# Patient Record
Sex: FEMALE | Race: WHITE | NOT HISPANIC OR LATINO
[De-identification: names, ages, dates, MRNs, and addresses within clinical notes are randomized per-mention and may not be internally consistent; named-entity substitution may affect disease eponyms.]

---

## 2017-07-24 ENCOUNTER — APPOINTMENT (OUTPATIENT)
Dept: OBGYN | Facility: CLINIC | Age: 52
End: 2017-07-24
Payer: COMMERCIAL

## 2017-07-24 ENCOUNTER — LABORATORY RESULT (OUTPATIENT)
Age: 52
End: 2017-07-24

## 2017-07-24 VITALS
TEMPERATURE: 98 F | WEIGHT: 190 LBS | SYSTOLIC BLOOD PRESSURE: 120 MMHG | HEIGHT: 67.5 IN | DIASTOLIC BLOOD PRESSURE: 60 MMHG | BODY MASS INDEX: 29.47 KG/M2

## 2017-07-24 PROCEDURE — 81003 URINALYSIS AUTO W/O SCOPE: CPT | Mod: QW

## 2017-07-24 PROCEDURE — 99396 PREV VISIT EST AGE 40-64: CPT

## 2017-07-31 LAB
BILIRUB UR QL STRIP: NORMAL
CLARITY UR: CLEAR
COLLECTION METHOD: NORMAL
GLUCOSE UR-MCNC: NORMAL
HCG UR QL: 0.2 EU/DL
HGB UR QL STRIP.AUTO: NORMAL
KETONES UR-MCNC: NORMAL
LEUKOCYTE ESTERASE UR QL STRIP: NORMAL
NITRITE UR QL STRIP: NORMAL
PH UR STRIP: 5
PROT UR STRIP-MCNC: NORMAL
SP GR UR STRIP: 1.02

## 2017-08-28 ENCOUNTER — MESSAGE (OUTPATIENT)
Age: 52
End: 2017-08-28

## 2017-09-01 ENCOUNTER — MESSAGE (OUTPATIENT)
Age: 52
End: 2017-09-01

## 2017-09-05 ENCOUNTER — MESSAGE (OUTPATIENT)
Age: 52
End: 2017-09-05

## 2017-09-11 ENCOUNTER — RX RENEWAL (OUTPATIENT)
Age: 52
End: 2017-09-11

## 2017-10-12 ENCOUNTER — MESSAGE (OUTPATIENT)
Age: 52
End: 2017-10-12

## 2017-10-21 ENCOUNTER — TRANSCRIPTION ENCOUNTER (OUTPATIENT)
Age: 52
End: 2017-10-21

## 2017-10-23 ENCOUNTER — APPOINTMENT (OUTPATIENT)
Dept: OBGYN | Facility: CLINIC | Age: 52
End: 2017-10-23
Payer: COMMERCIAL

## 2017-10-23 VITALS
TEMPERATURE: 98 F | SYSTOLIC BLOOD PRESSURE: 110 MMHG | HEIGHT: 67.5 IN | DIASTOLIC BLOOD PRESSURE: 82 MMHG | WEIGHT: 190 LBS | BODY MASS INDEX: 29.47 KG/M2 | RESPIRATION RATE: 16 BRPM

## 2017-10-23 PROCEDURE — 99213 OFFICE O/P EST LOW 20 MIN: CPT

## 2017-11-07 ENCOUNTER — RX RENEWAL (OUTPATIENT)
Age: 52
End: 2017-11-07

## 2017-11-16 ENCOUNTER — APPOINTMENT (OUTPATIENT)
Dept: ANTEPARTUM | Facility: CLINIC | Age: 52
End: 2017-11-16
Payer: COMMERCIAL

## 2017-11-16 ENCOUNTER — ASOB RESULT (OUTPATIENT)
Age: 52
End: 2017-11-16

## 2017-11-16 ENCOUNTER — APPOINTMENT (OUTPATIENT)
Dept: OBGYN | Facility: CLINIC | Age: 52
End: 2017-11-16
Payer: COMMERCIAL

## 2017-11-16 DIAGNOSIS — N95.0 POSTMENOPAUSAL BLEEDING: ICD-10-CM

## 2017-11-16 PROCEDURE — 76830 TRANSVAGINAL US NON-OB: CPT

## 2017-11-16 PROCEDURE — 99214 OFFICE O/P EST MOD 30 MIN: CPT

## 2017-11-16 PROCEDURE — 76857 US EXAM PELVIC LIMITED: CPT

## 2017-11-20 ENCOUNTER — RESULT REVIEW (OUTPATIENT)
Age: 52
End: 2017-11-20

## 2018-02-05 ENCOUNTER — MESSAGE (OUTPATIENT)
Age: 53
End: 2018-02-05

## 2018-02-05 ENCOUNTER — OUTPATIENT (OUTPATIENT)
Dept: OUTPATIENT SERVICES | Facility: HOSPITAL | Age: 53
LOS: 1 days | Discharge: HOME | End: 2018-02-05

## 2018-02-05 ENCOUNTER — APPOINTMENT (OUTPATIENT)
Dept: OBGYN | Facility: CLINIC | Age: 53
End: 2018-02-05
Payer: COMMERCIAL

## 2018-02-05 VITALS
HEIGHT: 67.5 IN | WEIGHT: 188 LBS | DIASTOLIC BLOOD PRESSURE: 62 MMHG | BODY MASS INDEX: 29.16 KG/M2 | TEMPERATURE: 98.1 F | SYSTOLIC BLOOD PRESSURE: 104 MMHG | RESPIRATION RATE: 16 BRPM

## 2018-02-05 VITALS
SYSTOLIC BLOOD PRESSURE: 107 MMHG | WEIGHT: 188.5 LBS | TEMPERATURE: 98 F | HEART RATE: 67 BPM | DIASTOLIC BLOOD PRESSURE: 79 MMHG | HEIGHT: 67.5 IN | RESPIRATION RATE: 16 BRPM | OXYGEN SATURATION: 100 %

## 2018-02-05 DIAGNOSIS — Z41.9 ENCOUNTER FOR PROCEDURE FOR PURPOSES OTHER THAN REMEDYING HEALTH STATE, UNSPECIFIED: Chronic | ICD-10-CM

## 2018-02-05 DIAGNOSIS — N81.6 RECTOCELE: ICD-10-CM

## 2018-02-05 DIAGNOSIS — Z98.890 OTHER SPECIFIED POSTPROCEDURAL STATES: Chronic | ICD-10-CM

## 2018-02-05 DIAGNOSIS — Z90.89 ACQUIRED ABSENCE OF OTHER ORGANS: Chronic | ICD-10-CM

## 2018-02-05 LAB
ANION GAP SERPL CALC-SCNC: 4 MMOL/L — LOW (ref 5–17)
APPEARANCE UR: CLEAR — SIGNIFICANT CHANGE UP
BACTERIA # UR AUTO: (no result)
BASOPHILS # BLD AUTO: 0.1 K/UL — SIGNIFICANT CHANGE UP (ref 0–0.2)
BASOPHILS NFR BLD AUTO: 1.1 % — SIGNIFICANT CHANGE UP (ref 0–2)
BILIRUB UR-MCNC: NEGATIVE — SIGNIFICANT CHANGE UP
BLD GP AB SCN SERPL QL: SIGNIFICANT CHANGE UP
BUN SERPL-MCNC: 13 MG/DL — SIGNIFICANT CHANGE UP (ref 7–23)
CALCIUM SERPL-MCNC: 9.2 MG/DL — SIGNIFICANT CHANGE UP (ref 8.5–10.1)
CHLORIDE SERPL-SCNC: 108 MMOL/L — SIGNIFICANT CHANGE UP (ref 96–108)
CO2 SERPL-SCNC: 29 MMOL/L — SIGNIFICANT CHANGE UP (ref 22–31)
COLOR SPEC: YELLOW — SIGNIFICANT CHANGE UP
COMMENT - URINE: SIGNIFICANT CHANGE UP
CREAT SERPL-MCNC: 0.83 MG/DL — SIGNIFICANT CHANGE UP (ref 0.5–1.3)
DIFF PNL FLD: NEGATIVE — SIGNIFICANT CHANGE UP
EOSINOPHIL # BLD AUTO: 0.2 K/UL — SIGNIFICANT CHANGE UP (ref 0–0.5)
EOSINOPHIL NFR BLD AUTO: 2.5 % — SIGNIFICANT CHANGE UP (ref 0–6)
EPI CELLS # UR: SIGNIFICANT CHANGE UP
GLUCOSE SERPL-MCNC: 72 MG/DL — SIGNIFICANT CHANGE UP (ref 70–99)
GLUCOSE UR QL: NEGATIVE MG/DL — SIGNIFICANT CHANGE UP
HCT VFR BLD CALC: 43.1 % — SIGNIFICANT CHANGE UP (ref 34.5–45)
HGB BLD-MCNC: 14.7 G/DL — SIGNIFICANT CHANGE UP (ref 11.5–15.5)
KETONES UR-MCNC: NEGATIVE — SIGNIFICANT CHANGE UP
LEUKOCYTE ESTERASE UR-ACNC: (no result)
LYMPHOCYTES # BLD AUTO: 3.1 K/UL — SIGNIFICANT CHANGE UP (ref 1–3.3)
LYMPHOCYTES # BLD AUTO: 39.7 % — SIGNIFICANT CHANGE UP (ref 13–44)
MCHC RBC-ENTMCNC: 28.9 PG — SIGNIFICANT CHANGE UP (ref 27–34)
MCHC RBC-ENTMCNC: 34.2 GM/DL — SIGNIFICANT CHANGE UP (ref 32–36)
MCV RBC AUTO: 84.6 FL — SIGNIFICANT CHANGE UP (ref 80–100)
MONOCYTES # BLD AUTO: 0.6 K/UL — SIGNIFICANT CHANGE UP (ref 0–0.9)
MONOCYTES NFR BLD AUTO: 7.5 % — SIGNIFICANT CHANGE UP (ref 2–14)
NEUTROPHILS # BLD AUTO: 3.8 K/UL — SIGNIFICANT CHANGE UP (ref 1.8–7.4)
NEUTROPHILS NFR BLD AUTO: 49.1 % — SIGNIFICANT CHANGE UP (ref 43–77)
NITRITE UR-MCNC: NEGATIVE — SIGNIFICANT CHANGE UP
PH UR: 6.5 — SIGNIFICANT CHANGE UP (ref 5–8)
PLATELET # BLD AUTO: 256 K/UL — SIGNIFICANT CHANGE UP (ref 150–400)
POTASSIUM SERPL-MCNC: 4.3 MMOL/L — SIGNIFICANT CHANGE UP (ref 3.5–5.3)
POTASSIUM SERPL-SCNC: 4.3 MMOL/L — SIGNIFICANT CHANGE UP (ref 3.5–5.3)
PROT UR-MCNC: NEGATIVE MG/DL — SIGNIFICANT CHANGE UP
RBC # BLD: 5.1 M/UL — SIGNIFICANT CHANGE UP (ref 3.8–5.2)
RBC # FLD: 13.3 % — SIGNIFICANT CHANGE UP (ref 10.3–14.5)
RBC CASTS # UR COMP ASSIST: SIGNIFICANT CHANGE UP /HPF (ref 0–4)
SODIUM SERPL-SCNC: 141 MMOL/L — SIGNIFICANT CHANGE UP (ref 135–145)
SP GR SPEC: 1.02 — SIGNIFICANT CHANGE UP (ref 1.01–1.02)
TYPE + AB SCN PNL BLD: SIGNIFICANT CHANGE UP
UROBILINOGEN FLD QL: 4 MG/DL
WBC # BLD: 7.7 K/UL — SIGNIFICANT CHANGE UP (ref 3.8–10.5)
WBC # FLD AUTO: 7.7 K/UL — SIGNIFICANT CHANGE UP (ref 3.8–10.5)
WBC UR QL: SIGNIFICANT CHANGE UP /HPF (ref 0–5)

## 2018-02-05 PROCEDURE — 99214 OFFICE O/P EST MOD 30 MIN: CPT

## 2018-02-05 NOTE — H&P PST ADULT - ASSESSMENT
51 y/o F scheduled for posterior repair with Dr. Munguia on 2/16/18    1. Labs as per surgeon  2. Discussed EZ sponges, pre-op & day of procedure instructions

## 2018-02-05 NOTE — H&P PST ADULT - PMH
Constipated    GERD (gastroesophageal reflux disease)    Hemorrhoids    Hot flashes    Multiple sclerosis    Paresthesia of arm    Urinary frequency    Urinary hesitancy

## 2018-02-05 NOTE — H&P PST ADULT - HISTORY OF PRESENT ILLNESS
This is a 51 y/o F with PMH MS, paresthesia of L arm, hot flashes, urinary frequency, murmur, GERD, constipation This is a 53 y/o F with PMH MS, paresthesia of L arm, hot flashes, urinary frequency, murmur, GERD, constipation who presents to PST prior to posterior repair with Dr. Munguia scheduled for 2/16/18. Reports vaginal/rectal prolapse that has occurred and worsened throughout 6 vaginal deliveries. Reports scant red blood associated with hemorrhoids that occurs infrequently, denies dark stools, abd. pain, CP, SOB, fevers, dizziness, LE edema.

## 2018-02-12 ENCOUNTER — RX RENEWAL (OUTPATIENT)
Age: 53
End: 2018-02-12

## 2018-02-15 RX ORDER — OXYCODONE HYDROCHLORIDE 5 MG/1
10 TABLET ORAL EVERY 6 HOURS
Qty: 0 | Refills: 0 | Status: DISCONTINUED | OUTPATIENT
Start: 2018-02-16 | End: 2018-02-16

## 2018-02-15 RX ORDER — ONDANSETRON 8 MG/1
4 TABLET, FILM COATED ORAL ONCE
Qty: 0 | Refills: 0 | Status: COMPLETED | OUTPATIENT
Start: 2018-02-16 | End: 2018-02-16

## 2018-02-16 ENCOUNTER — RESULT REVIEW (OUTPATIENT)
Age: 53
End: 2018-02-16

## 2018-02-16 ENCOUNTER — OUTPATIENT (OUTPATIENT)
Dept: OUTPATIENT SERVICES | Facility: HOSPITAL | Age: 53
LOS: 1 days | Discharge: HOME | End: 2018-02-16
Payer: COMMERCIAL

## 2018-02-16 ENCOUNTER — APPOINTMENT (OUTPATIENT)
Dept: OBGYN | Facility: HOSPITAL | Age: 53
End: 2018-02-16

## 2018-02-16 VITALS
RESPIRATION RATE: 14 BRPM | DIASTOLIC BLOOD PRESSURE: 73 MMHG | TEMPERATURE: 98 F | OXYGEN SATURATION: 100 % | SYSTOLIC BLOOD PRESSURE: 126 MMHG | HEART RATE: 79 BPM

## 2018-02-16 VITALS
OXYGEN SATURATION: 99 % | TEMPERATURE: 98 F | HEART RATE: 68 BPM | SYSTOLIC BLOOD PRESSURE: 119 MMHG | WEIGHT: 188.5 LBS | RESPIRATION RATE: 16 BRPM | DIASTOLIC BLOOD PRESSURE: 68 MMHG | HEIGHT: 67.5 IN

## 2018-02-16 DIAGNOSIS — Z98.890 OTHER SPECIFIED POSTPROCEDURAL STATES: Chronic | ICD-10-CM

## 2018-02-16 DIAGNOSIS — Z41.9 ENCOUNTER FOR PROCEDURE FOR PURPOSES OTHER THAN REMEDYING HEALTH STATE, UNSPECIFIED: Chronic | ICD-10-CM

## 2018-02-16 DIAGNOSIS — Z90.89 ACQUIRED ABSENCE OF OTHER ORGANS: Chronic | ICD-10-CM

## 2018-02-16 LAB — HCG UR QL: NEGATIVE — SIGNIFICANT CHANGE UP

## 2018-02-16 PROCEDURE — 57265 CMBN AP COLPRHY W/NTRCL RPR: CPT

## 2018-02-16 PROCEDURE — 88302 TISSUE EXAM BY PATHOLOGIST: CPT | Mod: 26

## 2018-02-16 RX ORDER — OXYCODONE HYDROCHLORIDE 5 MG/1
5 TABLET ORAL EVERY 4 HOURS
Qty: 0 | Refills: 0 | Status: DISCONTINUED | OUTPATIENT
Start: 2018-02-16 | End: 2018-02-16

## 2018-02-16 RX ORDER — CHOLECALCIFEROL (VITAMIN D3) 125 MCG
1 CAPSULE ORAL
Qty: 0 | Refills: 0 | COMMUNITY

## 2018-02-16 RX ORDER — PROGESTERONE 200 MG/1
2 CAPSULE, LIQUID FILLED ORAL
Qty: 0 | Refills: 0 | COMMUNITY

## 2018-02-16 RX ORDER — NATALIZUMAB 300 MG/15ML
1 INJECTION INTRAVENOUS
Qty: 0 | Refills: 0 | COMMUNITY

## 2018-02-16 RX ORDER — FENTANYL CITRATE 50 UG/ML
50 INJECTION INTRAVENOUS
Qty: 0 | Refills: 0 | Status: DISCONTINUED | OUTPATIENT
Start: 2018-02-16 | End: 2018-02-16

## 2018-02-16 RX ORDER — GABAPENTIN 400 MG/1
1 CAPSULE ORAL
Qty: 0 | Refills: 0 | COMMUNITY

## 2018-02-16 RX ORDER — TAMSULOSIN HYDROCHLORIDE 0.4 MG/1
1 CAPSULE ORAL
Qty: 0 | Refills: 0 | COMMUNITY

## 2018-02-16 RX ORDER — ESTERIFIED ESTROGENS, METHYLTESTOSTERONE 1.25; 2.5 MG/1; MG/1
1 TABLET ORAL
Qty: 0 | Refills: 0 | COMMUNITY

## 2018-02-16 RX ORDER — OXYCODONE AND ACETAMINOPHEN 5; 325 MG/1; MG/1
1 TABLET ORAL EVERY 4 HOURS
Qty: 0 | Refills: 0 | Status: DISCONTINUED | OUTPATIENT
Start: 2018-02-16 | End: 2018-02-16

## 2018-02-16 RX ORDER — SODIUM CHLORIDE 9 MG/ML
1000 INJECTION, SOLUTION INTRAVENOUS
Qty: 0 | Refills: 0 | Status: DISCONTINUED | OUTPATIENT
Start: 2018-02-16 | End: 2018-02-16

## 2018-02-16 RX ORDER — IBUPROFEN 200 MG
400 TABLET ORAL EVERY 8 HOURS
Qty: 0 | Refills: 0 | Status: DISCONTINUED | OUTPATIENT
Start: 2018-02-16 | End: 2018-03-03

## 2018-02-16 RX ORDER — PREGABALIN 225 MG/1
1 CAPSULE ORAL
Qty: 0 | Refills: 0 | COMMUNITY

## 2018-02-16 RX ADMIN — OXYCODONE HYDROCHLORIDE 10 MILLIGRAM(S): 5 TABLET ORAL at 17:33

## 2018-02-16 RX ADMIN — ONDANSETRON 4 MILLIGRAM(S): 8 TABLET, FILM COATED ORAL at 17:32

## 2018-02-16 NOTE — ASU PATIENT PROFILE, ADULT - VISION (WITH CORRECTIVE LENSES IF THE PATIENT USUALLY WEARS THEM):
OTC glasses for reading/Partially impaired: cannot see medication labels or newsprint, but can see obstacles in path, and the surrounding layout; can count fingers at arm's length

## 2018-02-16 NOTE — ASU DISCHARGE PLAN (ADULT/PEDIATRIC). - NOTIFY
Increased Irritability or Sluggishness/Excessive Diarrhea/Inability to Tolerate Liquids or Foods/GYN Fever>100.4/Pain not relieved by Medications/Persistent Nausea and Vomiting/Bleeding that does not stop

## 2018-02-16 NOTE — BRIEF OPERATIVE NOTE - PROCEDURE
<<-----Click on this checkbox to enter Procedure Colporrhaphy for repair of rectocele  02/16/2018    Active  CARMEN  Enterocele repair  02/16/2018    Active  CARMEN

## 2018-02-16 NOTE — BRIEF OPERATIVE NOTE - POST-OP DX
Enterocele  02/16/2018    Active  Emiel Munguia  Rectocele, female  02/16/2018    Active  Emile Munguia

## 2018-02-16 NOTE — ASU PATIENT PROFILE, ADULT - PMH
Constipated    GERD (gastroesophageal reflux disease)    Hemorrhoids    Hot flashes    Multiple sclerosis    Paresthesia of arm  left  Rectocele    Urinary frequency    Urinary hesitancy

## 2018-02-16 NOTE — ASU DISCHARGE PLAN (ADULT/PEDIATRIC). - MEDICATION SUMMARY - MEDICATIONS TO TAKE
I will START or STAY ON the medications listed below when I get home from the hospital:    hydrocodone-ibuprofen 7.5 mg-200 mg oral tablet  -- 1 tab(s) by mouth every 4 to 6 hours, As Needed -for severe pain  -for severe pain MDD:6 tabs   -- Caution federal law prohibits the transfer of this drug to any person other  than the person for whom it was prescribed.  Do not drink alcoholic beverages when taking this medication.  Do not take aspirin or aspirin containing products without knowledge and consent of your physician.  May cause drowsiness.  Alcohol may intensify this effect.  Use care when operating dangerous machinery.  Take with food or milk.  This drug may impair the ability to drive or operate machinery.  Use care until you become familiar with its effects.  Using more of this medication than prescribed may cause serious breathing problems.    -- Indication: For RECTOCELE

## 2018-02-21 LAB — SURGICAL PATHOLOGY FINAL REPORT - CH: SIGNIFICANT CHANGE UP

## 2018-02-23 DIAGNOSIS — G35 MULTIPLE SCLEROSIS: ICD-10-CM

## 2018-02-23 DIAGNOSIS — N81.6 RECTOCELE: ICD-10-CM

## 2018-02-23 DIAGNOSIS — N81.5 VAGINAL ENTEROCELE: ICD-10-CM

## 2018-02-26 ENCOUNTER — APPOINTMENT (OUTPATIENT)
Dept: OBGYN | Facility: CLINIC | Age: 53
End: 2018-02-26
Payer: COMMERCIAL

## 2018-02-26 VITALS
HEIGHT: 67.5 IN | SYSTOLIC BLOOD PRESSURE: 102 MMHG | RESPIRATION RATE: 16 BRPM | WEIGHT: 186 LBS | BODY MASS INDEX: 28.85 KG/M2 | DIASTOLIC BLOOD PRESSURE: 66 MMHG | TEMPERATURE: 98.4 F

## 2018-02-26 PROCEDURE — 99024 POSTOP FOLLOW-UP VISIT: CPT

## 2018-02-26 PROCEDURE — 85018 HEMOGLOBIN: CPT | Mod: QW

## 2018-03-06 LAB — HEMOGLOBIN: 12.4

## 2018-03-09 ENCOUNTER — RX RENEWAL (OUTPATIENT)
Age: 53
End: 2018-03-09

## 2018-03-29 ENCOUNTER — APPOINTMENT (OUTPATIENT)
Dept: OBGYN | Facility: CLINIC | Age: 53
End: 2018-03-29
Payer: COMMERCIAL

## 2018-03-29 VITALS
WEIGHT: 186 LBS | SYSTOLIC BLOOD PRESSURE: 114 MMHG | BODY MASS INDEX: 28.85 KG/M2 | DIASTOLIC BLOOD PRESSURE: 72 MMHG | HEIGHT: 67.5 IN

## 2018-03-29 DIAGNOSIS — N95.2 POSTMENOPAUSAL ATROPHIC VAGINITIS: ICD-10-CM

## 2018-03-29 DIAGNOSIS — Z48.89 ENCOUNTER FOR OTHER SPECIFIED SURGICAL AFTERCARE: ICD-10-CM

## 2018-03-29 DIAGNOSIS — R10.2 PELVIC AND PERINEAL PAIN: ICD-10-CM

## 2018-03-29 PROCEDURE — 99024 POSTOP FOLLOW-UP VISIT: CPT

## 2018-04-11 ENCOUNTER — RX RENEWAL (OUTPATIENT)
Age: 53
End: 2018-04-11

## 2018-05-10 ENCOUNTER — MEDICATION RENEWAL (OUTPATIENT)
Age: 53
End: 2018-05-10

## 2018-07-26 ENCOUNTER — APPOINTMENT (OUTPATIENT)
Dept: OBGYN | Facility: CLINIC | Age: 53
End: 2018-07-26
Payer: COMMERCIAL

## 2018-07-26 VITALS
SYSTOLIC BLOOD PRESSURE: 112 MMHG | HEIGHT: 68 IN | RESPIRATION RATE: 18 BRPM | BODY MASS INDEX: 28.79 KG/M2 | TEMPERATURE: 97.7 F | WEIGHT: 190 LBS | DIASTOLIC BLOOD PRESSURE: 70 MMHG | OXYGEN SATURATION: 98 %

## 2018-07-26 DIAGNOSIS — N81.6 RECTOCELE: ICD-10-CM

## 2018-07-26 PROCEDURE — 99396 PREV VISIT EST AGE 40-64: CPT

## 2018-07-31 LAB — CYTOLOGY CVX/VAG DOC THIN PREP: NORMAL

## 2019-03-01 ENCOUNTER — TRANSCRIPTION ENCOUNTER (OUTPATIENT)
Age: 54
End: 2019-03-01

## 2019-03-15 ENCOUNTER — MEDICATION RENEWAL (OUTPATIENT)
Age: 54
End: 2019-03-15

## 2019-04-25 PROBLEM — K59.00 CONSTIPATION, UNSPECIFIED: Chronic | Status: ACTIVE | Noted: 2018-02-05

## 2019-04-25 PROBLEM — K64.9 UNSPECIFIED HEMORRHOIDS: Chronic | Status: ACTIVE | Noted: 2018-02-05

## 2019-04-25 PROBLEM — N81.6 RECTOCELE: Chronic | Status: ACTIVE | Noted: 2018-02-16

## 2019-04-25 PROBLEM — G35 MULTIPLE SCLEROSIS: Chronic | Status: ACTIVE | Noted: 2018-02-05

## 2019-04-25 PROBLEM — R35.0 FREQUENCY OF MICTURITION: Chronic | Status: ACTIVE | Noted: 2018-02-05

## 2019-04-25 PROBLEM — K21.9 GASTRO-ESOPHAGEAL REFLUX DISEASE WITHOUT ESOPHAGITIS: Chronic | Status: ACTIVE | Noted: 2018-02-05

## 2019-04-25 PROBLEM — R23.2 FLUSHING: Chronic | Status: ACTIVE | Noted: 2018-02-05

## 2019-04-25 PROBLEM — R39.11 HESITANCY OF MICTURITION: Chronic | Status: ACTIVE | Noted: 2018-02-05

## 2019-04-25 PROBLEM — R20.2 PARESTHESIA OF SKIN: Chronic | Status: ACTIVE | Noted: 2018-02-05

## 2019-04-29 ENCOUNTER — TRANSCRIPTION ENCOUNTER (OUTPATIENT)
Age: 54
End: 2019-04-29

## 2019-04-29 ENCOUNTER — MEDICATION RENEWAL (OUTPATIENT)
Age: 54
End: 2019-04-29

## 2019-07-31 ENCOUNTER — MEDICATION RENEWAL (OUTPATIENT)
Age: 54
End: 2019-07-31

## 2019-08-08 ENCOUNTER — APPOINTMENT (OUTPATIENT)
Dept: OBGYN | Facility: CLINIC | Age: 54
End: 2019-08-08
Payer: COMMERCIAL

## 2019-08-08 VITALS
DIASTOLIC BLOOD PRESSURE: 80 MMHG | WEIGHT: 190.48 LBS | BODY MASS INDEX: 28.87 KG/M2 | SYSTOLIC BLOOD PRESSURE: 110 MMHG | HEIGHT: 68 IN

## 2019-08-08 DIAGNOSIS — Z78.9 OTHER SPECIFIED HEALTH STATUS: ICD-10-CM

## 2019-08-08 PROCEDURE — 99396 PREV VISIT EST AGE 40-64: CPT

## 2019-08-08 RX ORDER — ACETAMINOPHEN 325 MG
TABLET ORAL
Refills: 0 | Status: ACTIVE | COMMUNITY

## 2019-08-08 RX ORDER — DOCUSATE SODIUM 100 MG/1
100 CAPSULE ORAL
Refills: 0 | Status: ACTIVE | COMMUNITY

## 2019-08-08 RX ORDER — VITAMIN K2 90 MCG
125 MCG CAPSULE ORAL
Refills: 0 | Status: ACTIVE | COMMUNITY

## 2019-08-08 NOTE — PHYSICAL EXAM
[Awake] : awake [Alert] : alert [Acute Distress] : no acute distress [Mass] : no breast mass [Nipple Discharge] : no nipple discharge [Axillary LAD] : no axillary lymphadenopathy [Soft] : soft [Oriented x3] : oriented to person, place, and time [Tender] : non tender [No Bleeding] : there was no active vaginal bleeding [Uterine Adnexae] : were not tender and not enlarged [Occult Blood] : occult blood test from digital rectal exam was negative [Normal rectal exam] : was normal [Normal Brown Stool] : was normal and brown [Internal Hemorrhoid] : no internal hemorrhoids were present [Skin Tags] : no residual hemorrhoidal skin tags [External Hemorrhoid] : no external hemorrhoids were present [Normal] : was normal [None] : there was no rectal abscess

## 2019-08-08 NOTE — COUNSELING
[Nutrition] : nutrition [Breast Self Exam] : breast self exam [Vitamins/Supplements] : vitamins/supplements [Exercise] : exercise [Weight Management] : weight management

## 2019-09-16 ENCOUNTER — MEDICATION RENEWAL (OUTPATIENT)
Age: 54
End: 2019-09-16

## 2019-09-24 ENCOUNTER — FORM ENCOUNTER (OUTPATIENT)
Age: 54
End: 2019-09-24

## 2019-09-24 DIAGNOSIS — R92.1 MAMMOGRAPHIC CALCIFICATION FOUND ON DIAGNOSTIC IMAGING OF BREAST: ICD-10-CM

## 2019-09-25 ENCOUNTER — OUTPATIENT (OUTPATIENT)
Dept: OUTPATIENT SERVICES | Facility: HOSPITAL | Age: 54
LOS: 1 days | End: 2019-09-25
Payer: COMMERCIAL

## 2019-09-25 ENCOUNTER — APPOINTMENT (OUTPATIENT)
Dept: RADIOLOGY | Facility: CLINIC | Age: 54
End: 2019-09-25
Payer: COMMERCIAL

## 2019-09-25 ENCOUNTER — APPOINTMENT (OUTPATIENT)
Dept: MAMMOGRAPHY | Facility: CLINIC | Age: 54
End: 2019-09-25
Payer: COMMERCIAL

## 2019-09-25 DIAGNOSIS — Z98.890 OTHER SPECIFIED POSTPROCEDURAL STATES: Chronic | ICD-10-CM

## 2019-09-25 DIAGNOSIS — Z41.9 ENCOUNTER FOR PROCEDURE FOR PURPOSES OTHER THAN REMEDYING HEALTH STATE, UNSPECIFIED: Chronic | ICD-10-CM

## 2019-09-25 DIAGNOSIS — Z90.89 ACQUIRED ABSENCE OF OTHER ORGANS: Chronic | ICD-10-CM

## 2019-09-25 DIAGNOSIS — Z00.8 ENCOUNTER FOR OTHER GENERAL EXAMINATION: ICD-10-CM

## 2019-09-25 PROCEDURE — 77080 DXA BONE DENSITY AXIAL: CPT | Mod: 26

## 2019-09-25 PROCEDURE — 77063 BREAST TOMOSYNTHESIS BI: CPT | Mod: 26

## 2019-09-25 PROCEDURE — 77080 DXA BONE DENSITY AXIAL: CPT

## 2019-09-25 PROCEDURE — 77067 SCR MAMMO BI INCL CAD: CPT

## 2019-09-25 PROCEDURE — 77063 BREAST TOMOSYNTHESIS BI: CPT

## 2019-09-25 PROCEDURE — 77067 SCR MAMMO BI INCL CAD: CPT | Mod: 26

## 2019-09-26 ENCOUNTER — OTHER (OUTPATIENT)
Age: 54
End: 2019-09-26

## 2019-09-26 PROBLEM — R92.1 BREAST CALCIFICATION, LEFT: Status: ACTIVE | Noted: 2019-09-26

## 2019-10-01 ENCOUNTER — FORM ENCOUNTER (OUTPATIENT)
Age: 54
End: 2019-10-01

## 2019-10-02 ENCOUNTER — OUTPATIENT (OUTPATIENT)
Dept: OUTPATIENT SERVICES | Facility: HOSPITAL | Age: 54
LOS: 1 days | End: 2019-10-02
Payer: COMMERCIAL

## 2019-10-02 ENCOUNTER — APPOINTMENT (OUTPATIENT)
Dept: MAMMOGRAPHY | Facility: CLINIC | Age: 54
End: 2019-10-02
Payer: COMMERCIAL

## 2019-10-02 DIAGNOSIS — Z00.8 ENCOUNTER FOR OTHER GENERAL EXAMINATION: ICD-10-CM

## 2019-10-02 DIAGNOSIS — Z98.890 OTHER SPECIFIED POSTPROCEDURAL STATES: Chronic | ICD-10-CM

## 2019-10-02 DIAGNOSIS — Z41.9 ENCOUNTER FOR PROCEDURE FOR PURPOSES OTHER THAN REMEDYING HEALTH STATE, UNSPECIFIED: Chronic | ICD-10-CM

## 2019-10-02 DIAGNOSIS — Z90.89 ACQUIRED ABSENCE OF OTHER ORGANS: Chronic | ICD-10-CM

## 2019-10-02 PROCEDURE — 77065 DX MAMMO INCL CAD UNI: CPT | Mod: 26,LT

## 2019-10-02 PROCEDURE — 77065 DX MAMMO INCL CAD UNI: CPT

## 2019-10-03 ENCOUNTER — CLINICAL ADVICE (OUTPATIENT)
Age: 54
End: 2019-10-03

## 2019-10-15 ENCOUNTER — MEDICATION RENEWAL (OUTPATIENT)
Age: 54
End: 2019-10-15

## 2019-12-20 ENCOUNTER — APPOINTMENT (OUTPATIENT)
Dept: FAMILY MEDICINE | Facility: CLINIC | Age: 54
End: 2019-12-20

## 2020-05-13 ENCOUNTER — RESULT REVIEW (OUTPATIENT)
Age: 55
End: 2020-05-13

## 2020-05-13 ENCOUNTER — APPOINTMENT (OUTPATIENT)
Dept: MAMMOGRAPHY | Facility: CLINIC | Age: 55
End: 2020-05-13
Payer: COMMERCIAL

## 2020-05-13 ENCOUNTER — TRANSCRIPTION ENCOUNTER (OUTPATIENT)
Age: 55
End: 2020-05-13

## 2020-05-13 ENCOUNTER — OUTPATIENT (OUTPATIENT)
Dept: OUTPATIENT SERVICES | Facility: HOSPITAL | Age: 55
LOS: 1 days | End: 2020-05-13
Payer: COMMERCIAL

## 2020-05-13 DIAGNOSIS — Z41.9 ENCOUNTER FOR PROCEDURE FOR PURPOSES OTHER THAN REMEDYING HEALTH STATE, UNSPECIFIED: Chronic | ICD-10-CM

## 2020-05-13 DIAGNOSIS — Z98.890 OTHER SPECIFIED POSTPROCEDURAL STATES: Chronic | ICD-10-CM

## 2020-05-13 DIAGNOSIS — R92.1 MAMMOGRAPHIC CALCIFICATION FOUND ON DIAGNOSTIC IMAGING OF BREAST: ICD-10-CM

## 2020-05-13 DIAGNOSIS — Z90.89 ACQUIRED ABSENCE OF OTHER ORGANS: Chronic | ICD-10-CM

## 2020-05-13 PROCEDURE — 77065 DX MAMMO INCL CAD UNI: CPT

## 2020-05-13 PROCEDURE — 77065 DX MAMMO INCL CAD UNI: CPT | Mod: 26,LT

## 2020-05-13 PROCEDURE — G0279: CPT | Mod: 26

## 2020-05-13 PROCEDURE — G0279: CPT

## 2020-05-22 ENCOUNTER — APPOINTMENT (OUTPATIENT)
Dept: OBGYN | Facility: CLINIC | Age: 55
End: 2020-05-22
Payer: COMMERCIAL

## 2020-05-22 DIAGNOSIS — N95.1 MENOPAUSAL AND FEMALE CLIMACTERIC STATES: ICD-10-CM

## 2020-05-22 PROCEDURE — 99441: CPT

## 2020-08-13 ENCOUNTER — APPOINTMENT (OUTPATIENT)
Dept: OBGYN | Facility: CLINIC | Age: 55
End: 2020-08-13

## 2020-09-14 ENCOUNTER — APPOINTMENT (OUTPATIENT)
Dept: OBGYN | Facility: CLINIC | Age: 55
End: 2020-09-14
Payer: COMMERCIAL

## 2020-09-14 VITALS
BODY MASS INDEX: 28.64 KG/M2 | WEIGHT: 189 LBS | HEIGHT: 68 IN | SYSTOLIC BLOOD PRESSURE: 110 MMHG | DIASTOLIC BLOOD PRESSURE: 70 MMHG

## 2020-09-14 DIAGNOSIS — E89.41 SYMPTOMATIC POSTPROCEDURAL OVARIAN FAILURE: ICD-10-CM

## 2020-09-14 PROCEDURE — 99396 PREV VISIT EST AGE 40-64: CPT

## 2020-09-14 PROCEDURE — 82270 OCCULT BLOOD FECES: CPT

## 2020-09-14 NOTE — COUNSELING
[Body Image] : body image [Nutrition/ Exercise/ Weight Management] : nutrition, exercise, weight management [Vitamins/Supplements] : vitamins/supplements [Bladder Hygiene] : bladder hygiene

## 2020-09-14 NOTE — DISCUSSION/SUMMARY
[FreeTextEntry1] : 55 YO FEMALE PRESENTS FOR ANNUAL GYN EXAMINATION. DISCUSSED HORMONE REPLACEMENT THERAPY, RISKS, BENEFITS AND ALTERNATIVES. MAMMOGRAM ORDERED AND SELF BREAST EXAMINATION TAUGHT AND RECOMMENDED. BONE DENSITY STUDY INDICATIONS REVIEWED AND DISCUSSED. FOLLOW UP SCHEDULED.

## 2020-09-14 NOTE — PHYSICAL EXAM
[Appropriately responsive] : appropriately responsive [Alert] : alert [No Lymphadenopathy] : no lymphadenopathy [Regular Rate Rhythm] : regular rate rhythm [No Acute Distress] : no acute distress [Clear to Auscultation B/L] : clear to auscultation bilaterally [Soft] : soft [No Murmurs] : no murmurs [Non-distended] : non-distended [Non-tender] : non-tender [No Mass] : no mass [No Lesions] : no lesions [No HSM] : No HSM [Examination Of The Breasts] : a normal appearance [Oriented x3] : oriented x3 [No Masses] : no breast masses were palpable [Labia Minora] : normal [Labia Majora] : normal [Uterine Adnexae] : normal [Normal rectal exam] : was normal [Normal Brown Stool] : was normal and brown [External Hemorrhoid] : no external hemorrhoids were present [Internal Hemorrhoid] : no internal hemorrhoids were present [Skin Tags] : no residual hemorrhoidal skin tags [Normal] : was normal [None] : there was no rectal abscess

## 2020-11-13 ENCOUNTER — RESULT REVIEW (OUTPATIENT)
Age: 55
End: 2020-11-13

## 2020-11-13 ENCOUNTER — APPOINTMENT (OUTPATIENT)
Dept: MAMMOGRAPHY | Facility: CLINIC | Age: 55
End: 2020-11-13
Payer: COMMERCIAL

## 2020-11-13 ENCOUNTER — OUTPATIENT (OUTPATIENT)
Dept: OUTPATIENT SERVICES | Facility: HOSPITAL | Age: 55
LOS: 1 days | End: 2020-11-13
Payer: COMMERCIAL

## 2020-11-13 DIAGNOSIS — Z90.89 ACQUIRED ABSENCE OF OTHER ORGANS: Chronic | ICD-10-CM

## 2020-11-13 DIAGNOSIS — Z98.890 OTHER SPECIFIED POSTPROCEDURAL STATES: Chronic | ICD-10-CM

## 2020-11-13 DIAGNOSIS — Z00.8 ENCOUNTER FOR OTHER GENERAL EXAMINATION: ICD-10-CM

## 2020-11-13 DIAGNOSIS — Z41.9 ENCOUNTER FOR PROCEDURE FOR PURPOSES OTHER THAN REMEDYING HEALTH STATE, UNSPECIFIED: Chronic | ICD-10-CM

## 2020-11-13 PROCEDURE — 77066 DX MAMMO INCL CAD BI: CPT | Mod: 26

## 2020-11-13 PROCEDURE — G0279: CPT | Mod: 26

## 2020-11-13 PROCEDURE — 77066 DX MAMMO INCL CAD BI: CPT

## 2020-11-13 PROCEDURE — G0279: CPT

## 2020-11-23 ENCOUNTER — NON-APPOINTMENT (OUTPATIENT)
Age: 55
End: 2020-11-23

## 2021-06-04 ENCOUNTER — NON-APPOINTMENT (OUTPATIENT)
Age: 56
End: 2021-06-04

## 2021-06-23 ENCOUNTER — NON-APPOINTMENT (OUTPATIENT)
Age: 56
End: 2021-06-23

## 2021-06-24 ENCOUNTER — APPOINTMENT (OUTPATIENT)
Dept: FAMILY MEDICINE | Facility: CLINIC | Age: 56
End: 2021-06-24
Payer: COMMERCIAL

## 2021-06-24 VITALS
OXYGEN SATURATION: 97 % | DIASTOLIC BLOOD PRESSURE: 68 MMHG | TEMPERATURE: 96.2 F | BODY MASS INDEX: 28.39 KG/M2 | WEIGHT: 183 LBS | HEART RATE: 80 BPM | SYSTOLIC BLOOD PRESSURE: 110 MMHG | HEIGHT: 67.5 IN

## 2021-06-24 VITALS
TEMPERATURE: 96.2 F | OXYGEN SATURATION: 97 % | DIASTOLIC BLOOD PRESSURE: 68 MMHG | HEART RATE: 80 BPM | WEIGHT: 183 LBS | HEIGHT: 67.5 IN | BODY MASS INDEX: 28.39 KG/M2 | SYSTOLIC BLOOD PRESSURE: 110 MMHG

## 2021-06-24 PROCEDURE — 99072 ADDL SUPL MATRL&STAF TM PHE: CPT

## 2021-06-24 PROCEDURE — 99214 OFFICE O/P EST MOD 30 MIN: CPT

## 2021-06-24 RX ORDER — BETHANECHOL CHLORIDE 10 MG/1
10 TABLET ORAL 3 TIMES DAILY
Qty: 21 | Refills: 0 | Status: DISCONTINUED | COMMUNITY
Start: 2018-02-26 | End: 2021-06-24

## 2021-06-24 RX ORDER — ESTERIFIED ESTROGENS AND METHYTESTESTERONE .625; 1.25 MG/1; MG/1
0.625-1.25 TABLET ORAL
Qty: 90 | Refills: 0 | Status: DISCONTINUED | COMMUNITY
Start: 2018-03-09 | End: 2021-06-24

## 2021-06-24 RX ORDER — PROGESTERONE 100 MG/1
100 CAPSULE ORAL
Qty: 90 | Refills: 0 | Status: DISCONTINUED | COMMUNITY
Start: 2017-07-24 | End: 2021-06-24

## 2021-06-24 RX ORDER — ESTERIFIED ESTROGENS AND METHYTESTESTERONE .625; 1.25 MG/1; MG/1
0.625-1.25 TABLET ORAL
Qty: 90 | Refills: 0 | Status: DISCONTINUED | COMMUNITY
Start: 2018-04-12 | End: 2021-06-24

## 2021-06-24 RX ORDER — GLUCOSA SU 2KCL/CHONDROITIN SU 500-400 MG
500 CAPSULE ORAL
Refills: 0 | Status: DISCONTINUED | COMMUNITY
End: 2021-06-24

## 2021-06-24 RX ORDER — GABAPENTIN 100 MG/1
100 CAPSULE ORAL
Qty: 270 | Refills: 0 | Status: DISCONTINUED | COMMUNITY
Start: 2017-07-10 | End: 2021-06-24

## 2021-06-24 RX ORDER — CONJUGATED ESTROGENS 0.62 MG/G
0.62 CREAM VAGINAL
Qty: 32 | Refills: 3 | Status: DISCONTINUED | COMMUNITY
Start: 2018-03-29 | End: 2021-06-24

## 2021-06-24 RX ORDER — ESTERIFIED ESTROGENS AND METHYTESTESTERONE .625; 1.25 MG/1; MG/1
0.625-1.25 TABLET ORAL
Qty: 90 | Refills: 0 | Status: DISCONTINUED | COMMUNITY
Start: 2017-11-08 | End: 2021-06-24

## 2021-06-24 RX ORDER — ESTERIFIED ESTROGENS AND METHYTESTESTERONE .625; 1.25 MG/1; MG/1
0.625-1.25 TABLET ORAL
Qty: 90 | Refills: 0 | Status: DISCONTINUED | COMMUNITY
Start: 2017-10-12 | End: 2021-06-24

## 2021-06-24 RX ORDER — TAMSULOSIN HYDROCHLORIDE 0.4 MG/1
0.4 CAPSULE ORAL
Qty: 90 | Refills: 0 | Status: DISCONTINUED | COMMUNITY
Start: 2017-05-03 | End: 2021-06-24

## 2021-06-24 RX ORDER — ESTERIFIED ESTROGENS AND METHYTESTESTERONE .625; 1.25 MG/1; MG/1
0.625-1.25 TABLET ORAL
Qty: 90 | Refills: 0 | Status: DISCONTINUED | COMMUNITY
Start: 2017-07-24 | End: 2021-06-24

## 2021-06-24 NOTE — PLAN
[FreeTextEntry1] : I have advised that she may consider consulting with a Rheumatologist for extensive Auto Immune labwork for her  recent onset symptoms. Discussed if Auto Immune testing is negative , she may wish to discuss symptoms with her Neurologist and if this could be attributed to MS \par Referral list provided

## 2021-06-24 NOTE — HISTORY OF PRESENT ILLNESS
[FreeTextEntry8] : Patient has had Dry Mouth and Eyes  x several months.  She went to dentist and advised to try  biotin, this relieves her dryness temporarily .\par She spoke with her Neurologist , who ordered Sjogrens panel at patients request and testing was negative. She was advised none of her MS medications could be causing her dry mouth and eyes. She has been on same medication protocol for some time,   \par She hydrates well daily \par Patient is concerned re this new onset of Dry eyes and mouth that it may be autoimmune based .

## 2021-09-03 NOTE — BRIEF OPERATIVE NOTE - PRE-OP
[facility] [place] [address]  [address] [facility], [address] [facility] [address] [facility] [place]: [phone]  F: [phone]    Medical Nutrition Therapy    Nutrition Encounter:  Called and spoke with brother. Patient has gained 10# in the past month. Drinking 6 Boost VHC per day which is providing 3180kcal, 132g protein. He has started to advance to soft foods and tolerating. Has an appt with SLP on 9/8. Results:   Diagnosis: Laryngeal cancer   Cisplatin every 3 weeks and concurrent radiation. Started on 5/25/21    Chemotherapy Flowsheet 7/13/2021   Cycle [id number]   Date 7/13/2021   Drug / Regimen cisplatin   Pre Hydration given   Post Hydration given   Pre Meds given   Notes given     Wt Readings from Last 5 Encounters:   09/02/21 154 lb 9.6 oz (70.1 kg)   09/02/21 155 lb 6.4 oz (70.5 kg)   08/05/21 146 lb 6.4 oz (66.4 kg)   07/29/21 142 lb 9.6 oz (64.7 kg)   07/20/21 146 lb (66.2 kg)       Estimated Nutrition Needs:   Calorie Range: 2345-2680kcal/day    Protein Range: 67-77g/day     Fluid Needs: 2200ml     Assessment:   Inadequate oral food or beverage intake related to concurrent chemoradiation as evidence by treatment side effects. Plan:   - Decrease to 4 Boost VHC per day. - Continue to decrease as PO intake improves. - If he eats a full meal he can skip a Boost VHC. I appreciate the opportunity to participate in Mr. Dm Peterson's care.     Signed By: Jhonatan Bedoya, [address], [address]     Contact: [phone]
<<-----Click on this checkbox to enter Pre-Op Dx

## 2021-09-17 ENCOUNTER — RESULT REVIEW (OUTPATIENT)
Age: 56
End: 2021-09-17

## 2021-09-17 ENCOUNTER — APPOINTMENT (OUTPATIENT)
Dept: OBGYN | Facility: CLINIC | Age: 56
End: 2021-09-17
Payer: COMMERCIAL

## 2021-09-17 VITALS
HEIGHT: 67 IN | DIASTOLIC BLOOD PRESSURE: 60 MMHG | RESPIRATION RATE: 14 BRPM | SYSTOLIC BLOOD PRESSURE: 103 MMHG | WEIGHT: 187 LBS | HEART RATE: 75 BPM | BODY MASS INDEX: 29.35 KG/M2

## 2021-09-17 DIAGNOSIS — Z01.419 ENCOUNTER FOR GYNECOLOGICAL EXAMINATION (GENERAL) (ROUTINE) W/OUT ABNORMAL FINDINGS: ICD-10-CM

## 2021-09-17 PROCEDURE — 99396 PREV VISIT EST AGE 40-64: CPT

## 2021-09-17 NOTE — PLAN
[FreeTextEntry1] :  54YO FEMALE PRESENTS FOR ANNUAL GYN EXAMINATION. DISCUSSED HORMONE REPLACEMENT THERAPY, RISKS, BENEFITS AND ALTERNATIVES. MAMMOGRAM ORDERED AND SELF BREAST EXAMINATION TAUGHT AND RECOMMENDED. BONE DENSITY STUDY INDICATIONS REVIEWED AND DISCUSSED. FOLLOW UP SCHEDULED

## 2021-10-06 ENCOUNTER — NON-APPOINTMENT (OUTPATIENT)
Age: 56
End: 2021-10-06

## 2021-10-07 ENCOUNTER — APPOINTMENT (OUTPATIENT)
Dept: RHEUMATOLOGY | Facility: CLINIC | Age: 56
End: 2021-10-07
Payer: COMMERCIAL

## 2021-10-07 VITALS
HEART RATE: 73 BPM | BODY MASS INDEX: 29.66 KG/M2 | HEIGHT: 67 IN | DIASTOLIC BLOOD PRESSURE: 70 MMHG | OXYGEN SATURATION: 99 % | WEIGHT: 189 LBS | SYSTOLIC BLOOD PRESSURE: 120 MMHG | TEMPERATURE: 97.4 F

## 2021-10-07 DIAGNOSIS — M54.50 LOW BACK PAIN, UNSPECIFIED: ICD-10-CM

## 2021-10-07 DIAGNOSIS — Z78.0 ASYMPTOMATIC MENOPAUSAL STATE: ICD-10-CM

## 2021-10-07 DIAGNOSIS — Z87.19 PERSONAL HISTORY OF OTHER DISEASES OF THE DIGESTIVE SYSTEM: ICD-10-CM

## 2021-10-07 DIAGNOSIS — M25.572 PAIN IN LEFT ANKLE AND JOINTS OF LEFT FOOT: ICD-10-CM

## 2021-10-07 PROCEDURE — 99205 OFFICE O/P NEW HI 60 MIN: CPT

## 2021-10-07 RX ORDER — HYDROCODONE BITARTRATE AND IBUPROFEN 7.5; 2 MG/1; MG/1
7.5-2 TABLET ORAL
Qty: 24 | Refills: 0 | Status: DISCONTINUED | COMMUNITY
Start: 2018-02-16 | End: 2021-10-07

## 2021-10-07 NOTE — REASON FOR VISIT
[Consultation] : a consultation visit [FreeTextEntry1] : dry eyes/ dry mouth; raynaud's like symptoms; bone health

## 2021-10-07 NOTE — ASSESSMENT
[FreeTextEntry1] : 56-year-old female, here for the first time w/ hx of MS reports of dry mouth, dry eyes at times (not confirmed w/ Optho); raynaud's like symptoms in the hands >feet; painful oral ulcers at times; LBP to rule out CTD incld Sjogren syndrome.  \par -No synovitis or effusion on exam noted today and advised to monitor.\par -labs as below incld ESR, CRP, full serologies as below, TSH\par -Referred for xray of left ankle to evaluate for structural changes, r/o erosions \par -dry mouth: discussed biotene mouthwash or toothpaste or spray PRN; not much cavities and does have saliva pooling in the mouth\par -dry eyes: discussed can use artificial tears and check w/ Optho to see if noted \par \par Raynaud's: no ulcers and reports of color changes to red and purple in the hands>feet w/ the cold. Discussed to keep hands warm and if not controlled can add Ca channel blocker if BP allows\par -discussed can be primary idiopathic and will check for secondary autoimmune causes w/ labs as below to be complete\par \par hx of painful oral ulcers: can be aphthous ulcers and will check autoimmune abs incld MAUREEN w/ IF; HLA-B51, etc\par \par LBP: intermittent; reports hx of disc herniation on MRI \par -Referred for SI xray to confirm SI normal \par -Advil PRN w/ food needed sparingly helps\par \par Bone health: postmenopausal patient reports Dexa >2yrs ago w/ Dexa referral given today to evaluate for osteopenia/osteoporosis.\par \par -educated on symptoms to monitor for in detail and alert us if any concerns.\par -knows to stay up to date on health maintenance w/ PCP\par -f/u in 10-14 days w/ labs, xrays please\par

## 2021-10-07 NOTE — HISTORY OF PRESENT ILLNESS
[FreeTextEntry1] : 56-year-old female, here for the first time w/ hx of MS reports of dry mouth since around May of 2021. Patient states she does not have a lot of cavities. \par Patient states she notices dry eyes at times and has not checked with an ophthalmologist to see if it seen on their exam.\par Patient states she can notice some stiffness in her left hand at times. Denies any swelling or warmth and will monitor.\par States she notices some lower back pain worse with lifting; better with resting. Denies any loss of bladder or bowel incontinence or saddle anesthesias.\par States she has history of a disc herniation on MRI in the past.\par States she can have some left ankle soreness at times. She will monitor for any swelling.\par States she has history of oral ulcers that tend to be painful and none currently. Denies any nasal or genital ulcers.\par States she can notice that her hands more than feet can turn white or blue in the cold and controlled w/ keeping them warm without any ulcers.\par Denies any fever/chills, no rashes and no skin thickening today, no infectious diarrhea or  symptoms at this time.\par States she's not had a DEXA in over 2 years

## 2021-10-07 NOTE — PHYSICAL EXAM
[General Appearance - Alert] : alert [General Appearance - In No Acute Distress] : in no acute distress [Sclera] : the sclera and conjunctiva were normal [Extraocular Movements] : extraocular movements were intact [Outer Ear] : the ears and nose were normal in appearance [Neck Appearance] : the appearance of the neck was normal [Respiration, Rhythm And Depth] : normal respiratory rhythm and effort [Heart Rate And Rhythm] : heart rate was normal and rhythm regular [Heart Sounds] : normal S1 and S2 [Abdomen Soft] : soft [Abdomen Tenderness] : non-tender [Cervical Lymph Nodes Enlarged Anterior Bilaterally] : anterior cervical [Supraclavicular Lymph Nodes Enlarged Bilaterally] : supraclavicular [No CVA Tenderness] : no ~M costovertebral angle tenderness [Motor Tone] : muscle strength and tone were normal [] : no rash [No Focal Deficits] : no focal deficits [Impaired Insight] : insight and judgment were intact [Mood] : the mood was normal [FreeTextEntry1] : no synovitis or effusion on exam noted today

## 2021-10-08 LAB
25(OH)D3 SERPL-MCNC: 68.6 NG/ML
ALBUMIN SERPL ELPH-MCNC: 4.7 G/DL
ALP BLD-CCNC: 75 U/L
ALT SERPL-CCNC: 11 U/L
ANION GAP SERPL CALC-SCNC: 13 MMOL/L
AST SERPL-CCNC: 14 U/L
BASOPHILS # BLD AUTO: 0.07 K/UL
BASOPHILS NFR BLD AUTO: 1.1 %
BILIRUB SERPL-MCNC: 0.5 MG/DL
BUN SERPL-MCNC: 10 MG/DL
CALCIUM SERPL-MCNC: 9.8 MG/DL
CHLORIDE SERPL-SCNC: 104 MMOL/L
CK SERPL-CCNC: 67 U/L
CO2 SERPL-SCNC: 23 MMOL/L
CREAT SERPL-MCNC: 0.92 MG/DL
CRP SERPL-MCNC: 5 MG/L
EOSINOPHIL # BLD AUTO: 0.17 K/UL
EOSINOPHIL NFR BLD AUTO: 2.6 %
ERYTHROCYTE [SEDIMENTATION RATE] IN BLOOD BY WESTERGREN METHOD: 15 MM/HR
GLUCOSE SERPL-MCNC: 87 MG/DL
HCT VFR BLD CALC: 47.6 %
HGB BLD-MCNC: 15.5 G/DL
IMM GRANULOCYTES NFR BLD AUTO: 0.5 %
LYMPHOCYTES # BLD AUTO: 2.59 K/UL
LYMPHOCYTES NFR BLD AUTO: 39.4 %
MAN DIFF?: NORMAL
MCHC RBC-ENTMCNC: 29.2 PG
MCHC RBC-ENTMCNC: 32.6 GM/DL
MCV RBC AUTO: 89.6 FL
MONOCYTES # BLD AUTO: 0.47 K/UL
MONOCYTES NFR BLD AUTO: 7.1 %
NEUTROPHILS # BLD AUTO: 3.25 K/UL
NEUTROPHILS NFR BLD AUTO: 49.3 %
PLATELET # BLD AUTO: 259 K/UL
POTASSIUM SERPL-SCNC: 5 MMOL/L
PROT SERPL-MCNC: 6.8 G/DL
RBC # BLD: 5.31 M/UL
RBC # FLD: 15.9 %
RHEUMATOID FACT SER QL: <10 IU/ML
SODIUM SERPL-SCNC: 140 MMOL/L
TSH SERPL-ACNC: 3.78 UIU/ML
WBC # FLD AUTO: 6.58 K/UL

## 2021-10-10 PROBLEM — R79.89 ELEVATED LFTS: Status: RESOLVED | Noted: 2021-10-10 | Resolved: 2021-10-10

## 2021-10-10 PROBLEM — Z83.49 FAMILY HISTORY OF HYPOTHYROIDISM: Status: ACTIVE | Noted: 2021-10-10

## 2021-10-10 PROBLEM — Z72.89 ALCOHOL USE: Status: ACTIVE | Noted: 2019-08-08

## 2021-10-10 PROBLEM — Z82.5 FAMILY HISTORY OF CHRONIC OBSTRUCTIVE PULMONARY DISEASE: Status: ACTIVE | Noted: 2021-10-10

## 2021-10-10 PROBLEM — Z81.8 FAMILY HISTORY OF ANXIETY DISORDER: Status: ACTIVE | Noted: 2021-10-10

## 2021-10-12 LAB
ACE BLD-CCNC: 52 U/L
ANA SER IF-ACNC: NEGATIVE
CCP AB SER IA-ACNC: <8 UNITS
CENTROMERE IGG SER-ACNC: <0.2 CD:130001892
ENA RNP AB SER IA-ACNC: 0.7 AL
ENA SCL70 IGG SER IA-ACNC: <0.2 AL
ENA SM AB SER IA-ACNC: <0.2 AL
ENA SS-A AB SER IA-ACNC: <0.2 AL
ENA SS-B AB SER IA-ACNC: <0.2 AL
G6PD SER-CCNC: 15.8 U/G HGB
HSV 1+2 IGG SER IA-IMP: NEGATIVE
HSV1 IGG SER QL: 0.13 INDEX
RF+CCP IGG SER-IMP: NEGATIVE

## 2021-10-13 ENCOUNTER — MED ADMIN CHARGE (OUTPATIENT)
Age: 56
End: 2021-10-13

## 2021-10-13 ENCOUNTER — NON-APPOINTMENT (OUTPATIENT)
Age: 56
End: 2021-10-13

## 2021-10-13 ENCOUNTER — APPOINTMENT (OUTPATIENT)
Dept: FAMILY MEDICINE | Facility: CLINIC | Age: 56
End: 2021-10-13
Payer: COMMERCIAL

## 2021-10-13 VITALS
WEIGHT: 185 LBS | OXYGEN SATURATION: 99 % | TEMPERATURE: 97.4 F | HEIGHT: 67 IN | SYSTOLIC BLOOD PRESSURE: 102 MMHG | HEART RATE: 69 BPM | DIASTOLIC BLOOD PRESSURE: 62 MMHG | BODY MASS INDEX: 29.03 KG/M2

## 2021-10-13 DIAGNOSIS — Z23 ENCOUNTER FOR IMMUNIZATION: ICD-10-CM

## 2021-10-13 DIAGNOSIS — Z72.89 OTHER PROBLEMS RELATED TO LIFESTYLE: ICD-10-CM

## 2021-10-13 DIAGNOSIS — R79.89 OTHER SPECIFIED ABNORMAL FINDINGS OF BLOOD CHEMISTRY: ICD-10-CM

## 2021-10-13 DIAGNOSIS — Z81.8 FAMILY HISTORY OF OTHER MENTAL AND BEHAVIORAL DISORDERS: ICD-10-CM

## 2021-10-13 DIAGNOSIS — Z82.5 FAMILY HISTORY OF ASTHMA AND OTHER CHRONIC LOWER RESPIRATORY DISEASES: ICD-10-CM

## 2021-10-13 DIAGNOSIS — Z83.49 FAMILY HISTORY OF OTHER ENDOCRINE, NUTRITIONAL AND METABOLIC DISEASES: ICD-10-CM

## 2021-10-13 LAB
B19V IGG SER QL IA: 5.79 INDEX
B19V IGG+IGM SER-IMP: NORMAL
B19V IGG+IGM SER-IMP: POSITIVE
B19V IGM FLD-ACNC: 0.1 INDEX
B19V IGM SER-ACNC: NEGATIVE
MISCELLANEOUS TEST: NORMAL
PROC NAME: NORMAL

## 2021-10-13 PROCEDURE — 36415 COLL VENOUS BLD VENIPUNCTURE: CPT

## 2021-10-13 PROCEDURE — 93000 ELECTROCARDIOGRAM COMPLETE: CPT

## 2021-10-13 PROCEDURE — 90686 IIV4 VACC NO PRSV 0.5 ML IM: CPT

## 2021-10-13 PROCEDURE — 99396 PREV VISIT EST AGE 40-64: CPT | Mod: 25

## 2021-10-13 PROCEDURE — G0008: CPT

## 2021-10-13 NOTE — PHYSICAL EXAM
[No Acute Distress] : no acute distress [Well Developed] : well developed [Well-Appearing] : well-appearing [Normal Sclera/Conjunctiva] : normal sclera/conjunctiva [EOMI] : extraocular movements intact [Normal Outer Ear/Nose] : the outer ears and nose were normal in appearance [No JVD] : no jugular venous distention [No Lymphadenopathy] : no lymphadenopathy [Supple] : supple [Thyroid Normal, No Nodules] : the thyroid was normal and there were no nodules present [No Respiratory Distress] : no respiratory distress  [No Accessory Muscle Use] : no accessory muscle use [Clear to Auscultation] : lungs were clear to auscultation bilaterally [Regular Rhythm] : with a regular rhythm [Normal Rate] : normal rate  [Normal S1, S2] : normal S1 and S2 [No Murmur] : no murmur heard [No Carotid Bruits] : no carotid bruits [No Varicosities] : no varicosities [Pedal Pulses Present] : the pedal pulses are present [No Edema] : there was no peripheral edema [No Extremity Clubbing/Cyanosis] : no extremity clubbing/cyanosis [Soft] : abdomen soft [Non Tender] : non-tender [Non-distended] : non-distended [No Masses] : no abdominal mass palpated [No HSM] : no HSM [Normal Bowel Sounds] : normal bowel sounds [Normal Posterior Cervical Nodes] : no posterior cervical lymphadenopathy [Normal Anterior Cervical Nodes] : no anterior cervical lymphadenopathy [No Joint Swelling] : no joint swelling [Grossly Normal Strength/Tone] : grossly normal strength/tone [No Rash] : no rash [Coordination Grossly Intact] : coordination grossly intact [No Focal Deficits] : no focal deficits [Normal Gait] : normal gait [Normal Affect] : the affect was normal [Normal Insight/Judgement] : insight and judgment were intact [de-identified] : mildly overweight [de-identified] : no s/sxs acute synovitis

## 2021-10-13 NOTE — PLAN
[FreeTextEntry1] : Reviewed age-appropriate preventive screening tests with patient. UTD on gyn, mammogram, colonoscopy. Plans DEXA for near future and has Rx from rheum.  ECG SB/normal today. \par \par Tdap, Shingrix, flu vacc all revd and recommended. Flu vacc given today and she will plan Shingrix for near future\par \par Check lipids today (defer on CBC, CMP, TSH as we revd these were all recently done by Dr. Corea along with other rheum labs)\par \par Recommend f/u with neuro as recommended by neuro for mgmt of her MS.\par \par Discussed clean eating (eg Mediterranean style eating plan) and regular exercise/staying as physically active as possible.\par \par Reviewed importance of good self care (eg meditation, yoga, adequate rest, regular exercise, magnesium, clean eating etc).\par \par Next PE in 1 yr. RTO chol etc f/u and fasting labs in 6 mos

## 2021-10-13 NOTE — ASSESSMENT
[FreeTextEntry1] : TRAN OROZCO is a 56 year old female here for a physical exam and f/u of above medical issues. \par \par

## 2021-10-13 NOTE — REVIEW OF SYSTEMS
[Dryness] : dryness  [Insomnia] : insomnia [Negative] : Psychiatric [Joint Pain] : joint pain [Joint Stiffness] : joint stiffness [Discharge] : no discharge [Pain] : no pain [Redness] : no redness [Vision Problems] : no vision problems [Earache] : no earache [Nasal Discharge] : no nasal discharge [Sore Throat] : no sore throat [Postnasal Drip] : no postnasal drip [Anxiety] : no anxiety [Depression] : no depression [Easy Bleeding] : no easy bleeding [Easy Bruising] : no easy bruising [FreeTextEntry4] : +dry mouth [de-identified] : +MS, see HPI [de-identified] : sleep disturbances related to MS, see HPI [de-identified] : +Raynaud's

## 2021-10-13 NOTE — HISTORY OF PRESENT ILLNESS
[de-identified] : Her last PE was 11/2019\par \par Her last tetanus shot was 2010\par Pneumovax 1/2017\par Shingrix never-- her neurologist says she can get this and she will decide when fits in her schedule\par Had Pfizer COVID vacc series. Neuro advised does not need 3 dose series but if booster dose is recommended for her based on age she should get one then.\par \par Her last dentist visit was within past 6 months\par Her last eye doctor appointment was just prior to pandemic and plans f/u in near future \par \par Her diet is clean/healthful usually\par Exercises regularly usually \par \par Her last colonoscopy was 9/2016 wnl, plans rpt 7-8 yrs, Dr. Logan\par Her last mammogram was 11/2020, plans rpt in 11/2021 \par Her last DEXA was 2019 -0.4 L spine was lowest T score\par Her last gyn exam was 9/2021 (Dr. Munguia)\par \par [place] also has h/o MS, High chol and moderate tricuspid regurg (noted on 2014 echo).\par \par She was dx with MS in 2015 (presenting sxs were LUE paresthesias. She is followed by Dr. Panchal.  She is s/p 1 dose of IV steroids in 3/2015. She started Tysabri 5/2015. She is using Gabapentin 300 mg QHS which is effective (for sleep disturbance from her MS) and well tolerated. Her  has been very supportive . Imaging studies do not show any progression of disease . She is feeling well overall. Fatigue is stable and mild and manageable at this point. MS has been pretty stable over past year \par \par She is also undergoing eval for possible rheum issue (?Sjogren's etc) with Dr. Corea given sxs such as dry eyes/mouth, raynaud's, arthralgias. Labs for Sjogren's screen were negative but there are addtl tests that she is having done. Also will be having XR hands for further eval

## 2021-10-14 ENCOUNTER — TRANSCRIPTION ENCOUNTER (OUTPATIENT)
Age: 56
End: 2021-10-14

## 2021-10-14 LAB
CHOLEST SERPL-MCNC: 226 MG/DL
HDLC SERPL-MCNC: 43 MG/DL
HLA-B27 RELATED AG QL: POSITIVE
LDLC SERPL CALC-MCNC: 158 MG/DL
NONHDLC SERPL-MCNC: 183 MG/DL
TRIGL SERPL-MCNC: 125 MG/DL

## 2021-10-16 ENCOUNTER — TRANSCRIPTION ENCOUNTER (OUTPATIENT)
Age: 56
End: 2021-10-16

## 2021-10-18 ENCOUNTER — TRANSCRIPTION ENCOUNTER (OUTPATIENT)
Age: 56
End: 2021-10-18

## 2021-10-21 LAB
CA VI IGA AB: NORMAL
CA VI IGG AB: 11.3 EU/ML
CA VI IGM AB: 1.5 EU/ML
PSP IGA AB: NORMAL
PSP IGG AB: 3 EU/ML
PSP IGM AB: NORMAL
SEROLOGY COMMENTS: NORMAL
SP-1 IGA AB: NORMAL
SP-1 IGG AB: 10.1 EU/ML
SP-1 IGM AB: 3.7 EU/ML

## 2021-11-08 ENCOUNTER — TRANSCRIPTION ENCOUNTER (OUTPATIENT)
Age: 56
End: 2021-11-08

## 2021-11-12 ENCOUNTER — TRANSCRIPTION ENCOUNTER (OUTPATIENT)
Age: 56
End: 2021-11-12

## 2021-11-15 ENCOUNTER — APPOINTMENT (OUTPATIENT)
Dept: MAMMOGRAPHY | Facility: CLINIC | Age: 56
End: 2021-11-15
Payer: COMMERCIAL

## 2021-11-15 ENCOUNTER — RESULT REVIEW (OUTPATIENT)
Age: 56
End: 2021-11-15

## 2021-11-15 ENCOUNTER — OUTPATIENT (OUTPATIENT)
Dept: OUTPATIENT SERVICES | Facility: HOSPITAL | Age: 56
LOS: 1 days | End: 2021-11-15
Payer: COMMERCIAL

## 2021-11-15 ENCOUNTER — APPOINTMENT (OUTPATIENT)
Dept: RADIOLOGY | Facility: CLINIC | Age: 56
End: 2021-11-15
Payer: COMMERCIAL

## 2021-11-15 ENCOUNTER — APPOINTMENT (OUTPATIENT)
Dept: ULTRASOUND IMAGING | Facility: CLINIC | Age: 56
End: 2021-11-15
Payer: COMMERCIAL

## 2021-11-15 DIAGNOSIS — Z98.890 OTHER SPECIFIED POSTPROCEDURAL STATES: Chronic | ICD-10-CM

## 2021-11-15 DIAGNOSIS — Z90.89 ACQUIRED ABSENCE OF OTHER ORGANS: Chronic | ICD-10-CM

## 2021-11-15 DIAGNOSIS — Z41.9 ENCOUNTER FOR PROCEDURE FOR PURPOSES OTHER THAN REMEDYING HEALTH STATE, UNSPECIFIED: Chronic | ICD-10-CM

## 2021-11-15 DIAGNOSIS — Z00.00 ENCOUNTER FOR GENERAL ADULT MEDICAL EXAMINATION WITHOUT ABNORMAL FINDINGS: ICD-10-CM

## 2021-11-15 PROCEDURE — G0279: CPT | Mod: 26

## 2021-11-15 PROCEDURE — 77085 DXA BONE DENSITY AXL VRT FX: CPT | Mod: 26

## 2021-11-15 PROCEDURE — 72200 X-RAY EXAM SI JOINTS: CPT | Mod: 26

## 2021-11-15 PROCEDURE — 73600 X-RAY EXAM OF ANKLE: CPT | Mod: 26,LT

## 2021-11-15 PROCEDURE — 77066 DX MAMMO INCL CAD BI: CPT | Mod: 26

## 2021-11-15 PROCEDURE — 73600 X-RAY EXAM OF ANKLE: CPT

## 2021-11-15 PROCEDURE — G0279: CPT

## 2021-11-15 PROCEDURE — 77085 DXA BONE DENSITY AXL VRT FX: CPT

## 2021-11-15 PROCEDURE — 72200 X-RAY EXAM SI JOINTS: CPT

## 2021-11-15 PROCEDURE — 77066 DX MAMMO INCL CAD BI: CPT

## 2021-11-18 DIAGNOSIS — M47.818 SPONDYLOSIS W/OUT MYELOPATHY OR RADICULOPATHY, SACRAL AND SACROCOCCYGEAL REGION: ICD-10-CM

## 2021-11-19 ENCOUNTER — APPOINTMENT (OUTPATIENT)
Dept: FAMILY MEDICINE | Facility: CLINIC | Age: 56
End: 2021-11-19
Payer: COMMERCIAL

## 2021-11-19 PROCEDURE — 90750 HZV VACC RECOMBINANT IM: CPT

## 2021-11-19 PROCEDURE — 90471 IMMUNIZATION ADMIN: CPT

## 2021-11-23 ENCOUNTER — TRANSCRIPTION ENCOUNTER (OUTPATIENT)
Age: 56
End: 2021-11-23

## 2021-11-24 ENCOUNTER — TRANSCRIPTION ENCOUNTER (OUTPATIENT)
Age: 56
End: 2021-11-24

## 2021-11-30 ENCOUNTER — FORM ENCOUNTER (OUTPATIENT)
Age: 56
End: 2021-11-30

## 2021-12-02 ENCOUNTER — APPOINTMENT (OUTPATIENT)
Dept: MRI IMAGING | Facility: CLINIC | Age: 56
End: 2021-12-02
Payer: COMMERCIAL

## 2021-12-02 ENCOUNTER — OUTPATIENT (OUTPATIENT)
Dept: OUTPATIENT SERVICES | Facility: HOSPITAL | Age: 56
LOS: 1 days | End: 2021-12-02
Payer: COMMERCIAL

## 2021-12-02 DIAGNOSIS — M47.818 SPONDYLOSIS WITHOUT MYELOPATHY OR RADICULOPATHY, SACRAL AND SACROCOCCYGEAL REGION: ICD-10-CM

## 2021-12-02 DIAGNOSIS — Z90.89 ACQUIRED ABSENCE OF OTHER ORGANS: Chronic | ICD-10-CM

## 2021-12-02 DIAGNOSIS — Z98.890 OTHER SPECIFIED POSTPROCEDURAL STATES: Chronic | ICD-10-CM

## 2021-12-02 DIAGNOSIS — Z41.9 ENCOUNTER FOR PROCEDURE FOR PURPOSES OTHER THAN REMEDYING HEALTH STATE, UNSPECIFIED: Chronic | ICD-10-CM

## 2021-12-02 PROCEDURE — 72195 MRI PELVIS W/O DYE: CPT

## 2021-12-02 PROCEDURE — 72195 MRI PELVIS W/O DYE: CPT | Mod: 26

## 2021-12-29 ENCOUNTER — TRANSCRIPTION ENCOUNTER (OUTPATIENT)
Age: 56
End: 2021-12-29

## 2021-12-31 ENCOUNTER — TRANSCRIPTION ENCOUNTER (OUTPATIENT)
Age: 56
End: 2021-12-31

## 2022-01-21 ENCOUNTER — APPOINTMENT (OUTPATIENT)
Dept: FAMILY MEDICINE | Facility: CLINIC | Age: 57
End: 2022-01-21
Payer: COMMERCIAL

## 2022-01-21 ENCOUNTER — MED ADMIN CHARGE (OUTPATIENT)
Age: 57
End: 2022-01-21

## 2022-01-21 PROCEDURE — 90750 HZV VACC RECOMBINANT IM: CPT

## 2022-01-21 PROCEDURE — 90471 IMMUNIZATION ADMIN: CPT

## 2022-01-24 ENCOUNTER — APPOINTMENT (OUTPATIENT)
Dept: RHEUMATOLOGY | Facility: CLINIC | Age: 57
End: 2022-01-24
Payer: COMMERCIAL

## 2022-01-24 VITALS
DIASTOLIC BLOOD PRESSURE: 70 MMHG | OXYGEN SATURATION: 99 % | HEART RATE: 69 BPM | TEMPERATURE: 97.3 F | WEIGHT: 190 LBS | BODY MASS INDEX: 29.82 KG/M2 | SYSTOLIC BLOOD PRESSURE: 110 MMHG | HEIGHT: 67 IN

## 2022-01-24 DIAGNOSIS — Z71.2 PERSON CONSULTING FOR EXPLANATION OF EXAMINATION OR TEST FINDINGS: ICD-10-CM

## 2022-01-24 PROCEDURE — 99214 OFFICE O/P EST MOD 30 MIN: CPT

## 2022-01-24 NOTE — PHYSICAL EXAM
[General Appearance - Alert] : alert [General Appearance - In No Acute Distress] : in no acute distress [Sclera] : the sclera and conjunctiva were normal [Extraocular Movements] : extraocular movements were intact [Outer Ear] : the ears and nose were normal in appearance [Neck Appearance] : the appearance of the neck was normal [Respiration, Rhythm And Depth] : normal respiratory rhythm and effort [Heart Rate And Rhythm] : heart rate was normal and rhythm regular [Heart Sounds] : normal S1 and S2 [Abdomen Soft] : soft [Abdomen Tenderness] : non-tender [Cervical Lymph Nodes Enlarged Anterior Bilaterally] : anterior cervical [Supraclavicular Lymph Nodes Enlarged Bilaterally] : supraclavicular [No CVA Tenderness] : no ~M costovertebral angle tenderness [Motor Tone] : muscle strength and tone were normal [] : no rash [No Focal Deficits] : no focal deficits [Impaired Insight] : insight and judgment were intact [Mood] : the mood was normal [FreeTextEntry1] : no synovitis or effusion on exam noted today; good ROM in b/l shoulders; able to stand up w/o using her hands

## 2022-01-24 NOTE — REASON FOR VISIT
[Follow-Up: _____] : a [unfilled] follow-up visit [FreeTextEntry1] : +HLA-B27; raised CRP; hx of raynaud's; sicca symptoms; review labs/xrays/MRI/Dexa

## 2022-01-24 NOTE — HISTORY OF PRESENT ILLNESS
[FreeTextEntry1] : 56-year-old female, here for first follow w/ hx of MS reports of dry mouth since around May of 2021. Patient states she does not have a lot of cavities. States the dry mouth is better now.\par Patient states she notices dry eyes at times and has not checked with an ophthalmologist to see if it seen on their exam and will go if notes it.\par Denies any swelling or redness or warmth of any joints.\par States she notices some lower back pain worse with lifting; better with resting & not much lately. Denies any loss of bladder or bowel incontinence or saddle anesthesias.\par States she has history of a disc herniation on MRI in the past.\par States she has hx of  Lt heel pain worse in AM and gets better as the day goes on and not much lately.\par Denies any Achillies pain and no swelling.\par States she can notice that her hands more than feet can turn white or blue in the cold and controlled w/ keeping them warm without any ulcers.\par Denies any fever/chills, hx of oral ulcers in the past and not now; no nasal or genital ulcers; no rashes and no skin thickening today, no infectious diarrhea or  symptoms at this time.\par States she did recent Dexa to review. \par

## 2022-01-24 NOTE — ASSESSMENT
[FreeTextEntry1] : 56-year-old female, here for the first follow up w/ hx of MS reports of dry mouth at times, dry eyes at times (not confirmed w/ Optho); raynaud's like symptoms in the hands >feet; intermittent LBP w/ +HLA-B27 w/ mildly high CRP at this time.\par -No synovitis or effusion on exam noted today and advised to monitor.\par -reviewed labs 10/7/2021 w/ normal ESR, mildly high CRP=5 ( NL up to 4); +HLA-B27; all serologies WNL incld RO/LA negative \par -dry mouth: discussed biotene mouthwash or toothpaste or spray PRN; not much cavities and does have saliva pooling in the mouth now; discussed if severe can see ENT and consider lip biopsy to eval for Sjogern which is gold standard; can check early sjogren abs to be complete \par -dry eyes: discussed can use artificial tears and check w/ Optho to see if noted \par \par Raynaud's: no ulcers and reports of color changes to red and purple in the hands>feet w/ the cold. Discussed to keep hands warm and if not controlled can add Ca channel blocker if BP allows\par -discussed likely primary idiopathic \par -checked for secondary autoimmune causes w/ labs 10/7/21 all normal incld MAUREEN w/ IF neg; Sm/RNP neg; Scl-70 neg; centromere neg, etc at this time \par \par Raised CRP: mildly high CRP=5 ( NL up to 4)\par -denies any signs of infection; no synovitis; no PMR or GCA; denies any wt loss or night sweats and knows to stay up to date on health maintenance \par -will monitor on repeat\par \par +HLA-B27: discussed can be seen in normal population\par -denies any hx of psoriasis or IBD or reactive arthritis symptoms as this time \par -discussed to alert us if any synovitis of joints; or enthesitis; or dactylitis; or back pain not responsive to NSAIDs and states she understands\par \par LBP: intermittent; reports hx of disc herniation on MRI \par -Reviewed MRI bony pelvis 12/2/21 without sacroiliitis; multilevel spndylosis \par -reviewed xray SI 11/15/21 w/ subchondral sclerosis of b/l SI, osteophytes \par -Advil PRN w/ food needed sparingly helps\par \par hx of plantar fasciitis: reports hx of it in Lt heel pain worse in AM and gets better as the day goes on and not much lately\par -not much pain today\par -discussed stretching exercises & orthotics\par -Advil PRN w/ food if needed\par -if severe injections w/ podiatrist \par \par Bone health: reviewed Dexa 11/15/21 normal \par \par -educated on symptoms to monitor for in detail and alert us if any concerns.\par -knows to stay up to date on health maintenance w/ PCP\par -f/u in 9-12 mo w/ labs please or sooner as needed \par

## 2022-02-10 ENCOUNTER — TRANSCRIPTION ENCOUNTER (OUTPATIENT)
Age: 57
End: 2022-02-10

## 2022-02-22 ENCOUNTER — APPOINTMENT (OUTPATIENT)
Dept: FAMILY MEDICINE | Facility: CLINIC | Age: 57
End: 2022-02-22
Payer: COMMERCIAL

## 2022-02-22 PROCEDURE — 90471 IMMUNIZATION ADMIN: CPT

## 2022-02-22 PROCEDURE — 90715 TDAP VACCINE 7 YRS/> IM: CPT | Mod: GY

## 2022-04-25 ENCOUNTER — TRANSCRIPTION ENCOUNTER (OUTPATIENT)
Age: 57
End: 2022-04-25

## 2022-04-26 ENCOUNTER — APPOINTMENT (OUTPATIENT)
Dept: FAMILY MEDICINE | Facility: CLINIC | Age: 57
End: 2022-04-26
Payer: COMMERCIAL

## 2022-04-26 VITALS
DIASTOLIC BLOOD PRESSURE: 74 MMHG | SYSTOLIC BLOOD PRESSURE: 118 MMHG | WEIGHT: 184 LBS | HEIGHT: 67 IN | TEMPERATURE: 97 F | BODY MASS INDEX: 28.88 KG/M2

## 2022-04-26 DIAGNOSIS — M25.50 PAIN IN UNSPECIFIED JOINT: ICD-10-CM

## 2022-04-26 DIAGNOSIS — Z79.890 HORMONE REPLACEMENT THERAPY: ICD-10-CM

## 2022-04-26 PROCEDURE — 36415 COLL VENOUS BLD VENIPUNCTURE: CPT

## 2022-04-26 PROCEDURE — 99214 OFFICE O/P EST MOD 30 MIN: CPT | Mod: 25

## 2022-04-26 RX ORDER — ESTERIFIED ESTROGENS AND METHYTESTESTERONE .625; 1.25 MG/1; MG/1
0.625-1.25 TABLET ORAL
Qty: 90 | Refills: 0 | Status: DISCONTINUED | COMMUNITY
Start: 2018-02-05 | End: 2022-04-26

## 2022-04-26 NOTE — PHYSICAL EXAM
[No Acute Distress] : no acute distress [Well Developed] : well developed [Well-Appearing] : well-appearing [Normal Sclera/Conjunctiva] : normal sclera/conjunctiva [EOMI] : extraocular movements intact [Normal Outer Ear/Nose] : the outer ears and nose were normal in appearance [No JVD] : no jugular venous distention [No Lymphadenopathy] : no lymphadenopathy [Supple] : supple [Thyroid Normal, No Nodules] : the thyroid was normal and there were no nodules present [No Respiratory Distress] : no respiratory distress  [No Accessory Muscle Use] : no accessory muscle use [Clear to Auscultation] : lungs were clear to auscultation bilaterally [Normal Rate] : normal rate  [Regular Rhythm] : with a regular rhythm [Normal S1, S2] : normal S1 and S2 [No Murmur] : no murmur heard [No Carotid Bruits] : no carotid bruits [No Varicosities] : no varicosities [Pedal Pulses Present] : the pedal pulses are present [No Edema] : there was no peripheral edema [No Extremity Clubbing/Cyanosis] : no extremity clubbing/cyanosis [Soft] : abdomen soft [Non Tender] : non-tender [Non-distended] : non-distended [No Masses] : no abdominal mass palpated [No HSM] : no HSM [Normal Bowel Sounds] : normal bowel sounds [Normal Posterior Cervical Nodes] : no posterior cervical lymphadenopathy [Normal Anterior Cervical Nodes] : no anterior cervical lymphadenopathy [Grossly Normal Strength/Tone] : grossly normal strength/tone [No Joint Swelling] : no joint swelling [No Rash] : no rash [Coordination Grossly Intact] : coordination grossly intact [No Focal Deficits] : no focal deficits [Normal Gait] : normal gait [Normal Affect] : the affect was normal [Normal Insight/Judgement] : insight and judgment were intact [de-identified] : mildly overweight [de-identified] : no s/sxs acute synovitis

## 2022-04-26 NOTE — REVIEW OF SYSTEMS
[Dryness] : dryness  [Joint Pain] : joint pain [Joint Stiffness] : joint stiffness [Insomnia] : insomnia [Negative] : Psychiatric [Fever] : no fever [Chills] : no chills [Hot Flashes] : no hot flashes [Recent Change In Weight] : ~T recent weight change [Discharge] : no discharge [Pain] : no pain [Redness] : no redness [Vision Problems] : no vision problems [Earache] : no earache [Nasal Discharge] : no nasal discharge [Sore Throat] : no sore throat [Postnasal Drip] : no postnasal drip [Abdominal Pain] : no abdominal pain [Nausea] : no nausea [Constipation] : constipation [Diarrhea] : diarrhea [Vomiting] : no vomiting [Heartburn] : no heartburn [Anxiety] : no anxiety [Depression] : no depression [Easy Bleeding] : no easy bleeding [Easy Bruising] : no easy bruising [FreeTextEntry2] : Purposeful wgt loss with  eating and more consistent walking, has h/o hot flashes due to menopause and was on HT for this. Estrogen Rx was not able to RF'ed due to back order of med so she stopped and feeling fine without it. Still taking progesterone which I think she can stop now that she is off Estrogen but she will check with Dr. Munguia to verify before stopping.  [FreeTextEntry4] : +dry mouth [FreeTextEntry7] : constipation related to her MS and meds. I recommend magnesium citrate 100-400 mg daily to help with this [de-identified] : +MS, see HPI [de-identified] : sleep disturbances related to MS, see HPI [de-identified] : +Raynaud's

## 2022-04-26 NOTE — HISTORY OF PRESENT ILLNESS
[FreeTextEntry1] : TRAN OROZCO is a 56 year old female here for a follow up visit.\par  [de-identified] : \sam Paulino has h/o MS, High chol and moderate tricuspid regurg (noted on 2014 echo).\par \par She is tolerating meds well and taking them consistently. \par \par She is eating cleanly and exercising regularly\par \par She was dx with MS in 2015 (presenting sxs were LUE paresthesias. She is followed by Dr. Panchal. She is s/p 1 dose of IV steroids in 3/2015. She started Tysabri 5/2015. She is using Gabapentin 300 mg QHS which is effective (for sleep disturbance from her MS) and well tolerated. Her  has been very supportive. Imaging studies do not show any progression of disease. She is feeling well overall. Fatigue is stable and mild and manageable at this point. MS has been pretty stable over past year \par \par She had eval for possible rheum issue (?Sjogren's etc) with Dr. Corea in later 2021/early 2022 given sxs such as dry eyes/mouth, raynaud's, arthralgias. Labs for Sjogren's screen were negative and overall rheum labs were wnl/benign and not suggestive of signif autoimmune/rheum issue at this time. She will f/u with rheum prn and was also advised to see ENT and consider lip biopsy (as is gold standard to dx Sjogren s) is has severe or worsening sxs over time. For now she is doing supp measures (eg fluids, Biotene products etc).  \par \par She started trial of rosuvastatin in later 2021 as her LDL was persistently elevated despite hard work with clean eating/exercise. She is tolerating rosuvastatin daily without trouble. Did not need co Q10 as is hydrating well. \par calderon Became a grandmother (Pily Nielson) in early 2022! Is UTD on Tdap. \par

## 2022-04-26 NOTE — HEALTH RISK ASSESSMENT
[0] : 2) Feeling down, depressed, or hopeless: Not at all (0) [PHQ-2 Negative - No further assessment needed] : PHQ-2 Negative - No further assessment needed [[id number]] : 0

## 2022-04-26 NOTE — PLAN
[FreeTextEntry1] : Check labs today; continue same meds/doses pending results\par \par Recommend f/u with neuro as recommended by neuro for mgmt of her MS.\par \par LDL goal <=100 ideally. Reviewed risks/benefits of statin med. Recommended excellent hydration +/- co Q10 (100-400 mg daily) to decrease risk for statin related myalgias. \par \par Discussed clean eating (eg Mediterranean style eating plan) and regular exercise/staying as physically active as possible. Motion is lotion. \par \par Great job with wgt loss. \par \par Ok to remain off estrogen therapy at this point if no hot flashes/life interfering menopausal sxs off med. I think that now that she is off the estrogen she can d/c the progesterone (we revd role of progesterone use for women on estrogen therapy with a uterus) but she can verify with Dr. Munguia prior to d/c Progesterone just to make sure no other reason for her to be on this. \par \par Reviewed importance of good self care (eg meditation, yoga, adequate rest, regular exercise, magnesium, clean eating etc).\par \par Next visit in 6 months CPE/RPA visit and fasting labs.

## 2022-04-26 NOTE — ASSESSMENT
[FreeTextEntry1] : TRAN OROZCO is a 56 year old female here for follow up on medical issues as noted above.\par

## 2022-04-27 ENCOUNTER — TRANSCRIPTION ENCOUNTER (OUTPATIENT)
Age: 57
End: 2022-04-27

## 2022-04-27 LAB
ALBUMIN SERPL ELPH-MCNC: 4.5 G/DL
ALP BLD-CCNC: 87 U/L
ALT SERPL-CCNC: 29 U/L
ANION GAP SERPL CALC-SCNC: 12 MMOL/L
AST SERPL-CCNC: 23 U/L
BILIRUB SERPL-MCNC: 0.5 MG/DL
BUN SERPL-MCNC: 10 MG/DL
CALCIUM SERPL-MCNC: 10.2 MG/DL
CHLORIDE SERPL-SCNC: 104 MMOL/L
CHOLEST SERPL-MCNC: 155 MG/DL
CO2 SERPL-SCNC: 26 MMOL/L
CREAT SERPL-MCNC: 0.8 MG/DL
EGFR: 85 ML/MIN/1.73M2
GLUCOSE SERPL-MCNC: 93 MG/DL
HDLC SERPL-MCNC: 61 MG/DL
LDLC SERPL CALC-MCNC: 73 MG/DL
NONHDLC SERPL-MCNC: 93 MG/DL
POTASSIUM SERPL-SCNC: 5.2 MMOL/L
PROT SERPL-MCNC: 6.6 G/DL
SODIUM SERPL-SCNC: 142 MMOL/L
TRIGL SERPL-MCNC: 102 MG/DL

## 2022-04-28 ENCOUNTER — TRANSCRIPTION ENCOUNTER (OUTPATIENT)
Age: 57
End: 2022-04-28

## 2022-09-04 ENCOUNTER — NON-APPOINTMENT (OUTPATIENT)
Age: 57
End: 2022-09-04

## 2022-10-03 ENCOUNTER — APPOINTMENT (OUTPATIENT)
Dept: OBGYN | Facility: CLINIC | Age: 57
End: 2022-10-03

## 2022-10-03 VITALS
HEART RATE: 74 BPM | HEIGHT: 67 IN | BODY MASS INDEX: 28.41 KG/M2 | SYSTOLIC BLOOD PRESSURE: 110 MMHG | DIASTOLIC BLOOD PRESSURE: 60 MMHG | TEMPERATURE: 96.3 F | WEIGHT: 181 LBS | RESPIRATION RATE: 15 BRPM

## 2022-10-03 DIAGNOSIS — N95.2 POSTMENOPAUSAL ATROPHIC VAGINITIS: ICD-10-CM

## 2022-10-03 PROCEDURE — 99396 PREV VISIT EST AGE 40-64: CPT

## 2022-10-03 PROCEDURE — 82270 OCCULT BLOOD FECES: CPT

## 2022-10-03 NOTE — PLAN
[FreeTextEntry1] : PT WITH VAGINAL ATROPHY. DISCUSSED THE USE OF VAGINAL ESTROGEN. INSTRUCTIONS AND PRESCRIPTION GIVEN. NO CONTRAINDICATION TO USE. FOLLOWUP SCHEDULED. TOPICAL ESTROGEN RX  SENT. INSTRUCTIONS GIVEN

## 2022-10-10 ENCOUNTER — NON-APPOINTMENT (OUTPATIENT)
Age: 57
End: 2022-10-10

## 2022-10-21 ENCOUNTER — NON-APPOINTMENT (OUTPATIENT)
Age: 57
End: 2022-10-21

## 2022-10-21 ENCOUNTER — APPOINTMENT (OUTPATIENT)
Dept: NEUROLOGY | Facility: CLINIC | Age: 57
End: 2022-10-21

## 2022-10-21 VITALS
SYSTOLIC BLOOD PRESSURE: 110 MMHG | HEART RATE: 71 BPM | WEIGHT: 182 LBS | HEIGHT: 67 IN | BODY MASS INDEX: 28.56 KG/M2 | DIASTOLIC BLOOD PRESSURE: 71 MMHG | RESPIRATION RATE: 16 BRPM

## 2022-10-21 PROCEDURE — 99205 OFFICE O/P NEW HI 60 MIN: CPT

## 2022-10-21 RX ORDER — PROGESTERONE 100 MG/1
100 CAPSULE ORAL
Qty: 90 | Refills: 3 | Status: DISCONTINUED | COMMUNITY
Start: 2021-05-07 | End: 2022-10-21

## 2022-10-21 NOTE — ASSESSMENT
[FreeTextEntry1] : Assessment/Plan:\par  57 year old female w/ hx of relapsing multiple sclerosis, dx'd 2015, and has been on Tysabri since 2015. She has been clinically and radiologically stable. \par \par Plan: \par 1. Diagnostic Plan/Imaging: Repeat MRI brain and cervical spine w/w/o in Feb 2022, for radiological stability. Last brain MRI 8/2022 and last Cervical spine MRI 3/2020 at [facility]. MRI brain w/w/o every 6 months, given positive JCV antibody titer.\par \par 2. Disease Modifying therapy plan:\par Continue Tysabri 300 mg IV infusion every 6 weeks\par JCV antibody titer with every infusion\par JCV antibody highest was 0.48 in 6/2022\par (Insurance is changing in Jan 2023)\par \par 3. Symptomatic therapy plan:\par () Fatigue: Will continue to monitor. Discussed no pharmacological measures to manage fatigue. \par () Spasticity: Occasional, will continue to monitor.\par () Neuropathic pain: On gabapentin 600  mg qhs. \par () Bladder Dysfunction: She has neurogenic bladder with incomplete emptying, on tamsulosin, symptoms helping. Continue to follow with urologist.\par () Vitamin D3 and B12 supplementations \par \par \par Return to clinic 3 months\par \par The above plan was discussed with TRAN OROZCO in great detail.  TRAN OROZCO verbalized understanding and agrees with plan as detailed above. Patient was provided education and counselling on current diagnosis/symptoms, diagnostic work up, treatment options and potential side effects of any prescribed therapy/therapies. She was advised to call our clinic at [phone] for any new or worsening symptoms, or with any questions or concerns. In case of acute onset of neurological symptoms or worsening presentation, patient was advised to present to nearest emergency room for further evaluation. TRAN OROZCO expressed understanding and all her questions/concerns were addressed.\par \par Mercedes Ward M.D\par

## 2022-10-21 NOTE — DATA REVIEWED
[de-identified] : MRI brain w/w/o 8/31/2022- Stable compared to 3/2022. Reviewed images on patients [facility] portal. Left ovoid parietal lobe lesion noted. \par MRI C spine w/w/o 3/2020- Left sided cord lesion at C2-C3. non enhancing. \par MRI T w/w/o 3/2020- no cord lesions

## 2022-10-21 NOTE — PHYSICAL EXAM
[FreeTextEntry1] : PHYSICAL EXAM\par Constitutional: Alert, no acute distress \par Psychiatric: appropriate affect and mood\par Pulmonary: No respiratory distress, stable on room air\par \par NEUROLOGICAL EXAM\par Mental status: The patient is alert, attentive, and conversational memory intact\par Speech/language: Clear and fluent with intact comprehension\par Cranial nerves:\par CN II: Visual fields are full to confrontation. Pupil size equal and briskly reactive to light. \par CN III, IV, VI: EOMI, no nystagmus, no ptosis\par CN V: Facial sensation is intact to pinprick in all 3 divisions bilaterally.\par CN VII: Face is symmetric with normal eye closure and smile.\par CN VII: Hearing is normal to rubbing fingers\par CN IX, X: Palate elevates symmetrically. Phonation is normal.\par CN XI: Head turning and shoulder shrug are intact\par CN XII: Tongue is midline with normal movements and no atrophy.\par Motor: Strength is full bilaterally. 5/5 muscle power in bilateral UE and LE.\par Reflexes: Reflexes are 2+ and symmetric at the biceps, knees, and ankles. Plantar responses are flexor.\par Sensory: Reduced sensation to light touch and vibration (15 sec) in LUE, intact elsewhere. Vibration > 20 seconds elsewhere.\par Coordination:. There is no dysmetria on finger-to-nose. \par Gait/Stance: Posture is normal. Gait is steady with normal steps, base, arm swing, and turning. Heel and toe walking are normal. Imbalance on tandem gait and tandem Romberg. \par \par \par \par \par

## 2022-10-21 NOTE — HISTORY OF PRESENT ILLNESS
[FreeTextEntry1] : HPI (initial visit Oct 21, 2022)- TRAN OROZCO is a 57 year old right handed woman w/ hx Raynaud with SICCA, HLD, Multilevel spondylosis, referred to neurology for hx of Multiple sclerosis. \par \par In Feb 2015, she woke up one morning and could not feel the water on the left shoulder, numbness spread down her left arm. She was diagnosed with MS in April 2015 by neurologist in [place]. She was treated with IVMP. She had MRI, LP and EMG and ultimately diagnosed with MS. She has been followed by Dr. Panchal at [facility] and is currently on Tysabri every 6 weeks, she has been Tysabri since 2015. \par \par She has been getting brain MRI w/w/o contrast every 6 months. JCV ab level every month. She reports her MRI imaging have all been stable. No relapses on Tysabri. JCV antibody highest was 0.48 in 6/2022, most recent level on 9/14/2022 was indeterminate at 0.38. \par \par Day to day symptoms:- reduced sensation over left hand, 50% improved from initial relapse. \par \par She has 6 children, oldest daughter lives in [place]. She recently became a grandma.\par \par She has neurogenic bladder with incomplete emptying, on tamsulosin, symptoms helping. \par \par

## 2022-10-25 LAB — CYTOLOGY CVX/VAG DOC THIN PREP: NORMAL

## 2022-11-02 ENCOUNTER — NON-APPOINTMENT (OUTPATIENT)
Age: 57
End: 2022-11-02

## 2022-11-02 ENCOUNTER — APPOINTMENT (OUTPATIENT)
Dept: FAMILY MEDICINE | Facility: CLINIC | Age: 57
End: 2022-11-02

## 2022-11-02 VITALS
BODY MASS INDEX: 28.56 KG/M2 | TEMPERATURE: 97.1 F | WEIGHT: 182 LBS | SYSTOLIC BLOOD PRESSURE: 108 MMHG | HEIGHT: 67 IN | OXYGEN SATURATION: 99 % | HEART RATE: 70 BPM | DIASTOLIC BLOOD PRESSURE: 64 MMHG

## 2022-11-02 DIAGNOSIS — H04.129 DRY EYE SYNDROME OF UNSPECIFIED LACRIMAL GLAND: ICD-10-CM

## 2022-11-02 DIAGNOSIS — R79.82 ELEVATED C-REACTIVE PROTEIN (CRP): ICD-10-CM

## 2022-11-02 PROCEDURE — 93000 ELECTROCARDIOGRAM COMPLETE: CPT

## 2022-11-02 PROCEDURE — 99396 PREV VISIT EST AGE 40-64: CPT | Mod: 25

## 2022-11-02 PROCEDURE — 36415 COLL VENOUS BLD VENIPUNCTURE: CPT

## 2022-11-02 NOTE — PLAN
[FreeTextEntry1] : Continue all medications as prescribed. Check labs as above. Will adjust any medications based upon lab results. \par \par Reviewed age-appropriate preventive screening tests with patient. UTD on gyn exam, mammogram, DEXA and colonoscopy. She plans repeat mammogram for near future as is due again now\par Recommend f/u with neuro as recommended by neuro for mgmt of her MS.\par \par LDL goal <=100 ideally. Reviewed risks/benefits of statin med. Recommended excellent hydration +/- co Q10 (100-400 mg daily) to decrease risk for statin related myalgias. \par \par Revd/recommended non-urgent card eval to f/u on the mod TR noted on 2014 Echo, as well as for baseline cardiac testing in light of her high cholesterol, as that info might help to clarify her LDL goal further etc. \par \par Discussed clean eating (eg Mediterranean style eating plan) and regular exercise/staying as physically active as possible.  Include balance exercises and strength training and core strengthening exercises for bone health and to decrease risk for falls. Motion is lotion. \par \par Reviewed importance of good self care (eg meditation, yoga, adequate rest, regular exercise, magnesium, clean eating etc).\par \par Follow up for next physical in one year. Next visit in 6 months RPA visit and fasting labs, although if her labs are great today and she continues to do well on statin med/with clean eating/exercise it s also ok for her to defer on visit for 1 yr since she sees neuro regularly.

## 2022-11-02 NOTE — REVIEW OF SYSTEMS
[Recent Change In Weight] : ~T recent weight change [Dryness] : dryness  [Constipation] : constipation [Joint Pain] : joint pain [Joint Stiffness] : joint stiffness [Insomnia] : insomnia [Negative] : Psychiatric [Fever] : no fever [Chills] : no chills [Hot Flashes] : no hot flashes [Discharge] : no discharge [Pain] : no pain [Redness] : no redness [Vision Problems] : no vision problems [Earache] : no earache [Nasal Discharge] : no nasal discharge [Sore Throat] : no sore throat [Postnasal Drip] : no postnasal drip [Abdominal Pain] : no abdominal pain [Nausea] : no nausea [Diarrhea] : diarrhea [Vomiting] : no vomiting [Heartburn] : no heartburn [Anxiety] : no anxiety [Depression] : no depression [Easy Bleeding] : no easy bleeding [Easy Bruising] : no easy bruising [FreeTextEntry2] : Purposeful wgt loss over this year with  eating and more consistent walking [FreeTextEntry4] : +dry mouth [FreeTextEntry7] : constipation related to her MS and meds. I recommend magnesium citrate 100-400 mg daily to help with this [de-identified] : +MS, see HPI, takes gabapentin 300 mg 2 pills QHS for scalp related neuralgia pain (can tolerate the discomfort during the day without need for med) [de-identified] : sleep disturbances related to MS, see HPI [de-identified] : +Raynaud's

## 2022-11-02 NOTE — HISTORY OF PRESENT ILLNESS
[de-identified] : Her last physical exam was last year (10/13/2021)\par  \par Vaccines: \par Tetanus is up to date; last received 02/22/2022\par Pneumococcal vaccination is up to date; received one dose of PPSV-23 01/11/2017 (due to MS)\par Shingrix is up to date; received 2 doses 11/19/2021 and 01/21/2022\par COVID vaccine is up to date \par  \par Her last dentist visit was less than one year ago \par Her last eye doctor appointment was less than one year ago \par Her last dermatologist visit was less than one year ago \par  \par GYN visit is up to date; last 9/2022 (Dr. Munguia)\par Mammogram is up to date; last 11/2021\par Colon cancer screening is up to date; colonoscopy 9/2016 wnl, 7-8yr f/u recommended (Dr. Logan)\par DEXA is up to date; last was 11/2021 normal\par \par Her diet is healthy overall \par Exercise: regularly, walking\par \par Virginia has h/o MS, High cholesterol and moderate tricuspid regurg (noted on 2014 echo).\par \par She is tolerating meds well and taking them consistently. \par \par She was dx with MS in 2015 (presenting sxs were LUE paresthesias. She was followed by Dr. Panchal who has now retired and she established care with Dr. Mercedes Ward. She started Tysabri 5/2015. She is using Gabapentin 300 mg QHS which is effective (for sleep disturbance from her MS) and well tolerated. Her  has been very supportive. Imaging studies do not show any progression of disease. She is feeling well overall. Fatigue is stable and mild and manageable at this point. MS has been pretty stable over past couple of years \par \par She had eval for possible rheum issue (?Sjogren's etc) with Dr. Corea in later 2021/early 2022 given sxs such as dry eyes/mouth, raynaud's, arthralgias. Labs for Sjogren's screen were negative and overall rheum labs were wnl/benign and not suggestive of signif autoimmune/rheum issue at this time. She will f/u with rheum prn and was also advised to see ENT and consider lip biopsy (as is gold standard to dx Sjogren s) is has severe or worsening sxs over time. For now she is doing supp measures (eg fluids, Biotene products etc). Rheum had ordered labs to be done this Fall and we will do those with labs today \par \par She started trial of rosuvastatin in later 2021 as her LDL was persistently elevated despite hard work with clean eating/exercise. She is tolerating rosuvastatin daily without trouble. Did not need co Q10 as is hydrating well.

## 2022-11-02 NOTE — PHYSICAL EXAM
[No Acute Distress] : no acute distress [Well Developed] : well developed [Well-Appearing] : well-appearing [Normal Sclera/Conjunctiva] : normal sclera/conjunctiva [EOMI] : extraocular movements intact [Normal Outer Ear/Nose] : the outer ears and nose were normal in appearance [No JVD] : no jugular venous distention [No Lymphadenopathy] : no lymphadenopathy [Supple] : supple [Thyroid Normal, No Nodules] : the thyroid was normal and there were no nodules present [No Accessory Muscle Use] : no accessory muscle use [No Respiratory Distress] : no respiratory distress  [Clear to Auscultation] : lungs were clear to auscultation bilaterally [Normal Rate] : normal rate  [Regular Rhythm] : with a regular rhythm [Normal S1, S2] : normal S1 and S2 [No Murmur] : no murmur heard [No Carotid Bruits] : no carotid bruits [No Varicosities] : no varicosities [Pedal Pulses Present] : the pedal pulses are present [No Edema] : there was no peripheral edema [No Extremity Clubbing/Cyanosis] : no extremity clubbing/cyanosis [Soft] : abdomen soft [Non Tender] : non-tender [Non-distended] : non-distended [No Masses] : no abdominal mass palpated [No HSM] : no HSM [Normal Bowel Sounds] : normal bowel sounds [Normal Posterior Cervical Nodes] : no posterior cervical lymphadenopathy [No Joint Swelling] : no joint swelling [Normal Anterior Cervical Nodes] : no anterior cervical lymphadenopathy [Grossly Normal Strength/Tone] : grossly normal strength/tone [No Rash] : no rash [Coordination Grossly Intact] : coordination grossly intact [No Focal Deficits] : no focal deficits [Normal Gait] : normal gait [Normal Affect] : the affect was normal [Normal Insight/Judgement] : insight and judgment were intact [de-identified] : mildly overweight but wgt is down 8# compared to start of 2022 [de-identified] : no s/sxs acute synovitis

## 2022-11-02 NOTE — ASSESSMENT
[FreeTextEntry1] : TRAN OROZCO is a 57 year old female here for a physical exam.  She is also here to follow up on medical issues as noted above.\par \par Patient has a history of hypercholesterolemia, tricuspid regurgitation, multiple sclerosis, and dry eye syndrome/?poss SJogren's.

## 2022-11-03 ENCOUNTER — TRANSCRIPTION ENCOUNTER (OUTPATIENT)
Age: 57
End: 2022-11-03

## 2022-11-06 ENCOUNTER — TRANSCRIPTION ENCOUNTER (OUTPATIENT)
Age: 57
End: 2022-11-06

## 2022-11-06 LAB
25(OH)D3 SERPL-MCNC: 53.5 NG/ML
ALBUMIN SERPL ELPH-MCNC: 4.5 G/DL
ALP BLD-CCNC: 79 U/L
ALT SERPL-CCNC: 7 U/L
ANION GAP SERPL CALC-SCNC: 12 MMOL/L
AST SERPL-CCNC: 10 U/L
BASOPHILS # BLD AUTO: 0.06 K/UL
BASOPHILS NFR BLD AUTO: 1 %
BILIRUB SERPL-MCNC: 0.4 MG/DL
BUN SERPL-MCNC: 13 MG/DL
CALCIUM SERPL-MCNC: 9.4 MG/DL
CHLORIDE SERPL-SCNC: 104 MMOL/L
CHOLEST SERPL-MCNC: 152 MG/DL
CO2 SERPL-SCNC: 24 MMOL/L
CREAT SERPL-MCNC: 0.9 MG/DL
CRP SERPL-MCNC: <3 MG/L
EGFR: 74 ML/MIN/1.73M2
EOSINOPHIL # BLD AUTO: 0.23 K/UL
EOSINOPHIL NFR BLD AUTO: 3.7 %
ERYTHROCYTE [SEDIMENTATION RATE] IN BLOOD BY WESTERGREN METHOD: 17 MM/HR
GLUCOSE SERPL-MCNC: 90 MG/DL
HCT VFR BLD CALC: 43.2 %
HDLC SERPL-MCNC: 62 MG/DL
HGB BLD-MCNC: 13.8 G/DL
IMM GRANULOCYTES NFR BLD AUTO: 0.3 %
LDLC SERPL CALC-MCNC: 65 MG/DL
LYMPHOCYTES # BLD AUTO: 2.53 K/UL
LYMPHOCYTES NFR BLD AUTO: 40.5 %
MAN DIFF?: NORMAL
MCHC RBC-ENTMCNC: 28.3 PG
MCHC RBC-ENTMCNC: 31.9 GM/DL
MCV RBC AUTO: 88.7 FL
MONOCYTES # BLD AUTO: 0.48 K/UL
MONOCYTES NFR BLD AUTO: 7.7 %
NEUTROPHILS # BLD AUTO: 2.92 K/UL
NEUTROPHILS NFR BLD AUTO: 46.8 %
NONHDLC SERPL-MCNC: 90 MG/DL
PLATELET # BLD AUTO: 254 K/UL
POTASSIUM SERPL-SCNC: 4.3 MMOL/L
PROT SERPL-MCNC: 6.6 G/DL
RBC # BLD: 4.87 M/UL
RBC # FLD: 15.2 %
SODIUM SERPL-SCNC: 140 MMOL/L
TRIGL SERPL-MCNC: 124 MG/DL
TSH SERPL-ACNC: 3.27 UIU/ML
WBC # FLD AUTO: 6.24 K/UL

## 2022-11-12 ENCOUNTER — TRANSCRIPTION ENCOUNTER (OUTPATIENT)
Age: 57
End: 2022-11-12

## 2022-11-12 LAB
CA VI IGA AB: NORMAL
CA VI IGG AB: 3.8 EU/ML
CA VI IGM AB: 4 EU/ML
HLA-B27 RELATED AG QL: POSITIVE
PSP IGA AB: 8.2 EU/ML
PSP IGG AB: NORMAL
PSP IGM AB: 2.2 EU/ML
SEROLOGY COMMENTS: NORMAL
SP-1 IGA AB: 1.6 EU/ML
SP-1 IGG AB: 2.9 EU/ML
SP-1 IGM AB: 3 EU/ML

## 2022-11-16 ENCOUNTER — APPOINTMENT (OUTPATIENT)
Dept: MAMMOGRAPHY | Facility: CLINIC | Age: 57
End: 2022-11-16

## 2022-11-16 ENCOUNTER — OUTPATIENT (OUTPATIENT)
Dept: OUTPATIENT SERVICES | Facility: HOSPITAL | Age: 57
LOS: 1 days | End: 2022-11-16
Payer: COMMERCIAL

## 2022-11-16 ENCOUNTER — RESULT REVIEW (OUTPATIENT)
Age: 57
End: 2022-11-16

## 2022-11-16 DIAGNOSIS — Z41.9 ENCOUNTER FOR PROCEDURE FOR PURPOSES OTHER THAN REMEDYING HEALTH STATE, UNSPECIFIED: Chronic | ICD-10-CM

## 2022-11-16 DIAGNOSIS — Z00.8 ENCOUNTER FOR OTHER GENERAL EXAMINATION: ICD-10-CM

## 2022-11-16 DIAGNOSIS — Z98.890 OTHER SPECIFIED POSTPROCEDURAL STATES: Chronic | ICD-10-CM

## 2022-11-16 DIAGNOSIS — Z90.89 ACQUIRED ABSENCE OF OTHER ORGANS: Chronic | ICD-10-CM

## 2022-11-16 PROCEDURE — 77067 SCR MAMMO BI INCL CAD: CPT

## 2022-11-16 PROCEDURE — 77063 BREAST TOMOSYNTHESIS BI: CPT

## 2022-11-16 PROCEDURE — 77063 BREAST TOMOSYNTHESIS BI: CPT | Mod: 26

## 2022-11-16 PROCEDURE — 77067 SCR MAMMO BI INCL CAD: CPT | Mod: 26

## 2022-12-06 ENCOUNTER — NON-APPOINTMENT (OUTPATIENT)
Age: 57
End: 2022-12-06

## 2022-12-10 ENCOUNTER — NON-APPOINTMENT (OUTPATIENT)
Age: 57
End: 2022-12-10

## 2022-12-15 ENCOUNTER — APPOINTMENT (OUTPATIENT)
Dept: OBGYN | Facility: CLINIC | Age: 57
End: 2022-12-15

## 2022-12-15 VITALS
DIASTOLIC BLOOD PRESSURE: 70 MMHG | WEIGHT: 187 LBS | TEMPERATURE: 96 F | BODY MASS INDEX: 29.35 KG/M2 | SYSTOLIC BLOOD PRESSURE: 110 MMHG | HEIGHT: 67 IN

## 2022-12-15 DIAGNOSIS — B37.31 ACUTE CANDIDIASIS OF VULVA AND VAGINA: ICD-10-CM

## 2022-12-15 PROCEDURE — 99213 OFFICE O/P EST LOW 20 MIN: CPT

## 2022-12-15 NOTE — HISTORY OF PRESENT ILLNESS
[FreeTextEntry1] : Virginia is a 56 y/o  who presents today with c/o vulvar itching/irritation.  She was started on Premarin at her annual 10/2022 for vaginal atrophy. She states she stopped the Premarin about 3 weeks ago but the itching persists. She states she did apply Eucerin did help to alleviate the symptoms. She was also using Vagisil.

## 2022-12-15 NOTE — PHYSICAL EXAM
[Appropriately responsive] : appropriately responsive [Alert] : alert [No Acute Distress] : no acute distress [No Lymphadenopathy] : no lymphadenopathy [Oriented x3] : oriented x3 [Labia Majora Erythema] : erythema [Labia Minora Erythema] : erythema [Normal] : normal

## 2022-12-15 NOTE — DISCUSSION/SUMMARY
[FreeTextEntry1] : 1) pt with either vulvar candidiasis vs dermatitis, affirm obtained\par 2) rx for Fluconazole and Nystatin/Triamcinolone issued\par \par Pt advised to restart Premarin intravaginally only once all symptoms have resolved.

## 2022-12-16 ENCOUNTER — TRANSCRIPTION ENCOUNTER (OUTPATIENT)
Age: 57
End: 2022-12-16

## 2022-12-19 ENCOUNTER — NON-APPOINTMENT (OUTPATIENT)
Age: 57
End: 2022-12-19

## 2022-12-19 DIAGNOSIS — B96.89 ACUTE VAGINITIS: ICD-10-CM

## 2022-12-19 DIAGNOSIS — N76.0 ACUTE VAGINITIS: ICD-10-CM

## 2022-12-19 LAB
CANDIDA VAG CYTO: NOT DETECTED
G VAGINALIS+PREV SP MTYP VAG QL MICRO: DETECTED
T VAGINALIS VAG QL WET PREP: NOT DETECTED

## 2023-01-18 ENCOUNTER — APPOINTMENT (OUTPATIENT)
Dept: NEUROLOGY | Facility: CLINIC | Age: 58
End: 2023-01-18
Payer: COMMERCIAL

## 2023-01-18 PROCEDURE — 96413 CHEMO IV INFUSION 1 HR: CPT

## 2023-01-23 ENCOUNTER — APPOINTMENT (OUTPATIENT)
Dept: RHEUMATOLOGY | Facility: CLINIC | Age: 58
End: 2023-01-23
Payer: COMMERCIAL

## 2023-01-23 VITALS
DIASTOLIC BLOOD PRESSURE: 68 MMHG | SYSTOLIC BLOOD PRESSURE: 118 MMHG | WEIGHT: 178.5 LBS | BODY MASS INDEX: 27.96 KG/M2 | TEMPERATURE: 97.6 F | HEART RATE: 78 BPM | OXYGEN SATURATION: 98 %

## 2023-01-23 PROCEDURE — 99214 OFFICE O/P EST MOD 30 MIN: CPT

## 2023-01-23 NOTE — ASSESSMENT
[FreeTextEntry1] : 57-year-old female, here for follow up w/ hx of MS on Tysabri w/ her Neuro, Dr. Ward; +HLA-B27; multilevel spondylosis; reports of  reports of raynaud's like symptoms in the hands >feet & sicca symptoms.\par Her Sjogren abs and Early Sjogren abs are Negative and she defers lip biopsy that she knows is gold standard for Sjogren.\par -No synovitis or effusion on exam noted today and advised to monitor.\par -reviewed labs 11/2/22 +HLA-B27; ESR normal CRP normal; Early Sjogren abs negative; CBC ok; CMP ok; vit D normal; 10/7/2021 w/ normal ESR, mildly high CRP=5 ( NL up to 4); +HLA-B27; all serologies WNL incld RO/LA negative \par -dry mouth: discussed biotene gum prescribed; biotene mouthwash or biotene toothpaste or biotene spray PRN; not much cavities and does have saliva pooling in the mouth now\par -dry eyes: states told to use refresh from her Optho, Dr. Leonard \par \par Raynaud's: no ulcers and reports of color changes to red and purple in the hands>feet w/ the cold. Discussed to keep hands warm and if not controlled can add Ca channel blocker if BP allows\par -discussed likely primary idiopathic \par -checked for secondary autoimmune causes w/ labs 10/7/21 all normal incld MAUREEN w/ IF neg; Sm/RNP neg; Scl-70 neg; centromere neg, etc at this time \par \par +HLA-B27: discussed can be seen in normal population\par -denies any hx of psoriasis or IBD or reactive arthritis symptoms as this time \par -discussed to alert us if any synovitis of joints; or enthesitis; or dactylitis; or back pain not responsive to NSAIDs and states she understands\par -has normal ESR/CRP on labs 11/2/22 & will monitor \par \par LBP: intermittent; reports hx of disc herniation on MRI \par -MRI bony pelvis 12/2/21 without sacroiliitis; multilevel spondylosis \par -xray SI 11/15/21 w/ subchondral sclerosis of b/l SI, osteophytes \par -Advil PRN w/ food needed sparingly helps\par \par hx of plantar fasciitis: reports hx of it in Lt heel pain worse in AM and gets better as the day goes on and not much lately\par -not much pain today\par -discussed stretching exercises & orthotics\par -Advil PRN w/ food if needed\par -if severe injections w/ podiatrist \par \par Bone health: Dexa 11/15/21 normal \par -Dexa referral given to do after 11/15/23 to monitor \par \par -educated on symptoms to monitor for in detail and alert us if any concerns.\par -knows to stay up to date on health maintenance w/ PCP\par -f/u in 11 mo w/ labs, Dexa please or sooner as needed \par . \par \par

## 2023-01-23 NOTE — PHYSICAL EXAM
[General Appearance - Alert] : alert [General Appearance - In No Acute Distress] : in no acute distress [Sclera] : the sclera and conjunctiva were normal [Extraocular Movements] : extraocular movements were intact [Outer Ear] : the ears and nose were normal in appearance [Neck Appearance] : the appearance of the neck was normal [Respiration, Rhythm And Depth] : normal respiratory rhythm and effort [Heart Rate And Rhythm] : heart rate was normal and rhythm regular [Heart Sounds] : normal S1 and S2 [Abdomen Soft] : soft [Abdomen Tenderness] : non-tender [Supraclavicular Lymph Nodes Enlarged Bilaterally] : supraclavicular [Cervical Lymph Nodes Enlarged Anterior Bilaterally] : anterior cervical [No CVA Tenderness] : no ~M costovertebral angle tenderness [Motor Tone] : muscle strength and tone were normal [] : no rash [No Focal Deficits] : no focal deficits [Mood] : the mood was normal [Impaired Insight] : insight and judgment were intact [FreeTextEntry1] : No synovitis or effusion on exam noted today; Good ROM in b/l shoulders, no pelvic/girdle stiffness and able to stand up without using her hands

## 2023-01-23 NOTE — REASON FOR VISIT
[Follow-Up: _____] : a [unfilled] follow-up visit [FreeTextEntry1] : +HLA-B27; hx of raynaud's; sicca symptoms; review labs. \par

## 2023-01-23 NOTE — HISTORY OF PRESENT ILLNESS
[FreeTextEntry1] : 57-year-old female, here for follow w/ hx of MS  on Tysabri w/ her Neuro, Dr. Ward; +HLA-B27; multilevel spondylosis; reports of  reports of raynaud's like symptoms in the hands >feet & sicca symptoms.\par Her Sjogren abs and Early Sjogren abs are Negative and she defers lip biopsy that she knows is gold standard for Sjogren.\par She reports of dry mouth since around May of 2021. Patient states she does not have a lot of cavities. States the dry mouth is intermittent.\par Patient states she notices dry eyes and told by her Optho, Dr. Leonard to use refresh. \par Denies any swelling or redness or warmth of any joints.\par States she had some lower back pain worse with lifting; better with resting & Not much lately. Denies any loss of bladder or bowel incontinence or saddle anesthesias.\par States she has history of a disc herniation on MRI in the past.\par States she has hx of Lt heel pain worse in AM and gets better as the day goes on and not much lately.\par Denies any Achillies pain and no swelling.\par States she can notice that her hands more than feet can turn white or blue in the cold and controlled w/ keeping them warm without any ulcers.\par Denies any fever/chills, hx of oral ulcers in the past and not now; no nasal or genital ulcers; no rashes and no skin thickening today, no infectious diarrhea or  symptoms at this time.\par \par Her Dexa 11/15/21 is normal.\par

## 2023-01-24 LAB — CRP SERPL-MCNC: <3 MG/L

## 2023-01-25 ENCOUNTER — LABORATORY RESULT (OUTPATIENT)
Age: 58
End: 2023-01-25

## 2023-01-25 LAB — ERYTHROCYTE [SEDIMENTATION RATE] IN BLOOD BY WESTERGREN METHOD: < 2 MM/HR

## 2023-01-30 ENCOUNTER — APPOINTMENT (OUTPATIENT)
Dept: NEUROLOGY | Facility: CLINIC | Age: 58
End: 2023-01-30
Payer: COMMERCIAL

## 2023-01-30 ENCOUNTER — TRANSCRIPTION ENCOUNTER (OUTPATIENT)
Age: 58
End: 2023-01-30

## 2023-01-30 VITALS
WEIGHT: 184 LBS | DIASTOLIC BLOOD PRESSURE: 75 MMHG | BODY MASS INDEX: 28.88 KG/M2 | HEART RATE: 75 BPM | HEIGHT: 67 IN | SYSTOLIC BLOOD PRESSURE: 119 MMHG

## 2023-01-30 PROCEDURE — 99214 OFFICE O/P EST MOD 30 MIN: CPT

## 2023-01-30 NOTE — DATA REVIEWED
[de-identified] : MRI brain w/w/o 8/31/2022- Stable compared to 3/2022. Reviewed images on patients [facility] portal. Left ovoid parietal lobe lesion noted. \par MRI C spine w/w/o 3/2020- Left sided cord lesion at C2-C3. non enhancing. \par MRI T w/w/o 3/2020- no cord lesions

## 2023-01-30 NOTE — HISTORY OF PRESENT ILLNESS
[FreeTextEntry1] : INTERIM HX 01/30/2023: Received Tysabri infusion on 1/18/2023. JCV ab test completed last week, results pending. No new MS symptom. Needs new urologist. \par \par HPI (initial visit Oct 21, 2022)- TRAN OROZCO is a 57 year old right handed woman w/ hx Raynaud with SICCA, HLD, Multilevel spondylosis, referred to neurology for hx of Multiple sclerosis. \par \par In Feb 2015, she woke up one morning and could not feel the water on the left shoulder, numbness spread down her left arm. She was diagnosed with MS in April 2015 by neurologist in [place]. She was treated with IVMP. She had MRI, LP and EMG and ultimately diagnosed with MS. She has been followed by Dr. Panchal at [facility] and is currently on Tysabri every 6 weeks, she has been Tysabri since 2015. \par \par She has been getting brain MRI w/w/o contrast every 6 months. JCV ab level every month. She reports her MRI imaging have all been stable. No relapses on Tysabri. JCV antibody highest was 0.48 in 6/2022, most recent level on 9/14/2022 was indeterminate at 0.38. \par \par Day to day symptoms:- reduced sensation over left hand, 50% improved from initial relapse. \par \par She has 6 children, oldest daughter lives in [place]. She recently became a grandma.\par \par She has neurogenic bladder with incomplete emptying, on tamsulosin, symptoms helping. \par \par

## 2023-01-30 NOTE — PHYSICAL EXAM
[FreeTextEntry1] : PHYSICAL EXAM\par Constitutional: Alert, no acute distress \par Psychiatric: appropriate affect and mood\par Pulmonary: No respiratory distress, stable on room air\par \par NEUROLOGICAL EXAM\par Mental status: The patient is alert, attentive, and conversational memory intact\par Speech/language: Clear and fluent with intact comprehension\par Cranial nerves:\par CN II: Visual fields are full to confrontation. Pupil size equal and briskly reactive to light. \par CN III, IV, VI: EOMI, no nystagmus, no ptosis\par CN V: Facial sensation is intact to pinprick in all 3 divisions bilaterally.\par CN VII: Face is symmetric with normal eye closure and smile.\par CN VII: Hearing is normal to rubbing fingers\par CN IX, X: Palate elevates symmetrically. Phonation is normal.\par CN XI: Head turning and shoulder shrug are intact\par CN XII: Tongue is midline with normal movements and no atrophy.\par Motor: Strength is full bilaterally. 5/5 muscle power in bilateral UE and LE.\par Reflexes: Reflexes are 2+ and symmetric at the biceps, knees, and ankles. Plantar responses are flexor.\par Sensory: Reduced sensation to light touch and vibration (15 sec) in LUE, intact elsewhere. Vibration > 20 seconds elsewhere.\par Coordination:. There is no dysmetria on finger-to-nose. \par Gait/Stance: Posture is normal. Gait is steady with normal steps, base, arm swing, and turning. Heel and toe walking are normal. Imbalance on tandem gait and tandem Romberg.

## 2023-01-30 NOTE — ASSESSMENT
[FreeTextEntry1] : Assessment/Plan:\par  57 year old female w/ hx of relapsing multiple sclerosis, dx'd 2015, and has been on Tysabri since 2015. She has been clinically and radiologically stable. \par \par Plan: \par 1. Diagnostic Plan/Imaging: Repeat MRI brain and cervical spine w/w/o in Feb 2023, for radiological stability. Last brain MRI 8/2022 and last Cervical spine MRI 3/2020 at [facility]. MRI brain w/w/o every 6 months, given positive JCV antibody titer.\par - Pt brings MRI discs to visit. Will have them uploaded for review/comparison-\par \par \par 2. Disease Modifying therapy plan:\par Continue Tysabri 300 mg IV infusion every 6 weeks\par JCV antibody titer with every infusion\par JCV antibody highest was 0.48 in 6/2022\par Reviewed the risk of PML with patient at length. Reviewed PML risk stratification table. She wishes to continue therapy at this time. Addressed options of switching to Ocrelizumab or de-escalating therapy. Will continue to discuss at future visit.\par \par \par 3. Symptomatic therapy plan:\par () Fatigue: Will continue to monitor. Discussed no pharmacological measures to manage fatigue. \par () Spasticity: Occasional, will continue to monitor.\par () Neuropathic pain: On gabapentin 600 mg qhs. - will refill\par () Bladder Dysfunction: She has neurogenic bladder with incomplete emptying, on tamsulosin, symptoms helping. Continue to follow with urologist. Will give new referral. \par () Vitamin D3 and B12 supplementations \par \par \par Return to clinic 3 months\par \par The above plan was discussed with TRAN OROZCO in great detail. TRAN OROZCO verbalized understanding and agrees with plan as detailed above. Patient was provided education and counselling on current diagnosis/symptoms, diagnostic work up, treatment options and potential side effects of any prescribed therapy/therapies. She was advised to call our clinic at [phone] for any new or worsening symptoms, or with any questions or concerns. In case of acute onset of neurological symptoms or worsening presentation, patient was advised to present to nearest emergency room for further evaluation. TRAN OROZCO expressed understanding and all her questions/concerns were addressed.\par \par Mercedes Ward M.D

## 2023-02-03 ENCOUNTER — NON-APPOINTMENT (OUTPATIENT)
Age: 58
End: 2023-02-03

## 2023-02-03 ENCOUNTER — TRANSCRIPTION ENCOUNTER (OUTPATIENT)
Age: 58
End: 2023-02-03

## 2023-02-03 LAB
JCV INDEX: 0.26
STRATIFY JCV ANTIBODY: ABNORMAL

## 2023-02-06 ENCOUNTER — TRANSCRIPTION ENCOUNTER (OUTPATIENT)
Age: 58
End: 2023-02-06

## 2023-02-06 DIAGNOSIS — N89.8 OTHER SPECIFIED NONINFLAMMATORY DISORDERS OF VAGINA: ICD-10-CM

## 2023-02-07 ENCOUNTER — TRANSCRIPTION ENCOUNTER (OUTPATIENT)
Age: 58
End: 2023-02-07

## 2023-02-14 ENCOUNTER — APPOINTMENT (OUTPATIENT)
Dept: MRI IMAGING | Facility: CLINIC | Age: 58
End: 2023-02-14
Payer: COMMERCIAL

## 2023-02-14 ENCOUNTER — OUTPATIENT (OUTPATIENT)
Dept: OUTPATIENT SERVICES | Facility: HOSPITAL | Age: 58
LOS: 1 days | End: 2023-02-14
Payer: COMMERCIAL

## 2023-02-14 DIAGNOSIS — Z98.890 OTHER SPECIFIED POSTPROCEDURAL STATES: Chronic | ICD-10-CM

## 2023-02-14 DIAGNOSIS — Z90.89 ACQUIRED ABSENCE OF OTHER ORGANS: Chronic | ICD-10-CM

## 2023-02-14 DIAGNOSIS — Z41.9 ENCOUNTER FOR PROCEDURE FOR PURPOSES OTHER THAN REMEDYING HEALTH STATE, UNSPECIFIED: Chronic | ICD-10-CM

## 2023-02-14 DIAGNOSIS — G35 MULTIPLE SCLEROSIS: ICD-10-CM

## 2023-02-14 PROCEDURE — 72156 MRI NECK SPINE W/O & W/DYE: CPT

## 2023-02-14 PROCEDURE — 70553 MRI BRAIN STEM W/O & W/DYE: CPT

## 2023-02-14 PROCEDURE — 70553 MRI BRAIN STEM W/O & W/DYE: CPT | Mod: 26

## 2023-02-14 PROCEDURE — A9585: CPT

## 2023-02-14 PROCEDURE — 72156 MRI NECK SPINE W/O & W/DYE: CPT | Mod: 26

## 2023-02-15 ENCOUNTER — TRANSCRIPTION ENCOUNTER (OUTPATIENT)
Age: 58
End: 2023-02-15

## 2023-02-16 ENCOUNTER — APPOINTMENT (OUTPATIENT)
Dept: CARDIOLOGY | Facility: CLINIC | Age: 58
End: 2023-02-16
Payer: COMMERCIAL

## 2023-02-16 ENCOUNTER — NON-APPOINTMENT (OUTPATIENT)
Age: 58
End: 2023-02-16

## 2023-02-16 VITALS
WEIGHT: 189 LBS | HEART RATE: 81 BPM | HEIGHT: 67 IN | OXYGEN SATURATION: 99 % | BODY MASS INDEX: 29.66 KG/M2 | DIASTOLIC BLOOD PRESSURE: 76 MMHG | SYSTOLIC BLOOD PRESSURE: 124 MMHG

## 2023-02-16 DIAGNOSIS — I07.1 RHEUMATIC TRICUSPID INSUFFICIENCY: ICD-10-CM

## 2023-02-16 PROCEDURE — 93000 ELECTROCARDIOGRAM COMPLETE: CPT

## 2023-02-16 PROCEDURE — 99204 OFFICE O/P NEW MOD 45 MIN: CPT

## 2023-02-16 RX ORDER — TRIAMCINOLONE ACETONIDE 1 MG/G
0.1 OINTMENT TOPICAL
Qty: 1 | Refills: 1 | Status: DISCONTINUED | COMMUNITY
Start: 2022-12-15 | End: 2023-02-16

## 2023-02-16 RX ORDER — NATALIZUMAB 300 MG/15ML
300 INJECTION INTRAVENOUS
Refills: 0 | Status: DISCONTINUED | COMMUNITY
End: 2023-02-16

## 2023-02-16 RX ORDER — FLUCONAZOLE 150 MG/1
150 TABLET ORAL
Qty: 2 | Refills: 0 | Status: DISCONTINUED | COMMUNITY
Start: 2022-12-15 | End: 2023-02-16

## 2023-02-16 RX ORDER — FLUCONAZOLE 150 MG/1
150 TABLET ORAL
Qty: 2 | Refills: 0 | Status: DISCONTINUED | COMMUNITY
Start: 2023-02-06 | End: 2023-02-16

## 2023-02-16 NOTE — DISCUSSION/SUMMARY
[Mild Mitral Regurgitation] : mild mitral regurgitation [Patient] : the patient [FreeTextEntry4] : tricuspid regurgitation [FreeTextEntry1] : Pt will have an Echo to evaluate valvular abnormalities.  [EKG obtained to assist in diagnosis and management of assessed problem(s)] : EKG obtained to assist in diagnosis and management of assessed problem(s)

## 2023-02-16 NOTE — HISTORY OF PRESENT ILLNESS
[FreeTextEntry1] : 56 yo female presents for evaluation of valvular abnormality seen in 2014. Pt has a medical history noted. Pt denies chest pain or shortness of breath. PMD chart was reviewed. Past Echo was reviewed.

## 2023-02-22 ENCOUNTER — APPOINTMENT (OUTPATIENT)
Dept: CARDIOLOGY | Facility: CLINIC | Age: 58
End: 2023-02-22
Payer: COMMERCIAL

## 2023-02-22 PROCEDURE — 93306 TTE W/DOPPLER COMPLETE: CPT

## 2023-03-01 ENCOUNTER — APPOINTMENT (OUTPATIENT)
Dept: NEUROLOGY | Facility: CLINIC | Age: 58
End: 2023-03-01
Payer: COMMERCIAL

## 2023-03-01 PROCEDURE — 96413 CHEMO IV INFUSION 1 HR: CPT

## 2023-03-02 ENCOUNTER — LABORATORY RESULT (OUTPATIENT)
Age: 58
End: 2023-03-02

## 2023-03-13 LAB
JCV INDEX: 0.3
STRATIFY JCV ANTIBODY: ABNORMAL

## 2023-03-23 ENCOUNTER — APPOINTMENT (OUTPATIENT)
Dept: UROLOGY | Facility: CLINIC | Age: 58
End: 2023-03-23
Payer: COMMERCIAL

## 2023-03-23 ENCOUNTER — NON-APPOINTMENT (OUTPATIENT)
Age: 58
End: 2023-03-23

## 2023-03-23 VITALS
BODY MASS INDEX: 29.66 KG/M2 | DIASTOLIC BLOOD PRESSURE: 70 MMHG | SYSTOLIC BLOOD PRESSURE: 132 MMHG | HEIGHT: 67 IN | WEIGHT: 189 LBS

## 2023-03-23 DIAGNOSIS — N31.9 NEUROMUSCULAR DYSFUNCTION OF BLADDER, UNSPECIFIED: ICD-10-CM

## 2023-03-23 DIAGNOSIS — R33.9 RETENTION OF URINE, UNSPECIFIED: ICD-10-CM

## 2023-03-23 PROCEDURE — 99203 OFFICE O/P NEW LOW 30 MIN: CPT

## 2023-03-23 NOTE — ASSESSMENT
[FreeTextEntry1] : 56 yo F with neurognic bladder 2/2 MS, h/o incomplete bladder emptying but doing well now on alpha blocker. PVR 0 mL. Recommended screening for retention with PVR. RTO in 1 year or sooner PRN

## 2023-03-24 ENCOUNTER — NON-APPOINTMENT (OUTPATIENT)
Age: 58
End: 2023-03-24

## 2023-03-24 LAB
APPEARANCE: ABNORMAL
BACTERIA: NEGATIVE
BILIRUBIN URINE: NEGATIVE
BLOOD URINE: NEGATIVE
COLOR: NORMAL
GLUCOSE QUALITATIVE U: NEGATIVE
HYALINE CASTS: 3 /LPF
KETONES URINE: NEGATIVE
LEUKOCYTE ESTERASE URINE: ABNORMAL
MICROSCOPIC-UA: NORMAL
NITRITE URINE: NEGATIVE
PH URINE: 6
PROTEIN URINE: NEGATIVE
RED BLOOD CELLS URINE: 0 /HPF
SPECIFIC GRAVITY URINE: 1
SQUAMOUS EPITHELIAL CELLS: 9 /HPF
UROBILINOGEN URINE: NORMAL
WHITE BLOOD CELLS URINE: 11 /HPF

## 2023-03-27 LAB — BACTERIA UR CULT: NORMAL

## 2023-04-12 ENCOUNTER — APPOINTMENT (OUTPATIENT)
Dept: NEUROLOGY | Facility: CLINIC | Age: 58
End: 2023-04-12
Payer: COMMERCIAL

## 2023-04-12 PROCEDURE — 96413 CHEMO IV INFUSION 1 HR: CPT

## 2023-05-09 ENCOUNTER — TRANSCRIPTION ENCOUNTER (OUTPATIENT)
Age: 58
End: 2023-05-09

## 2023-05-10 ENCOUNTER — TRANSCRIPTION ENCOUNTER (OUTPATIENT)
Age: 58
End: 2023-05-10

## 2023-05-11 ENCOUNTER — APPOINTMENT (OUTPATIENT)
Dept: ORTHOPEDIC SURGERY | Facility: CLINIC | Age: 58
End: 2023-05-11
Payer: COMMERCIAL

## 2023-05-11 VITALS
WEIGHT: 188 LBS | SYSTOLIC BLOOD PRESSURE: 119 MMHG | HEART RATE: 73 BPM | DIASTOLIC BLOOD PRESSURE: 78 MMHG | BODY MASS INDEX: 29.51 KG/M2 | HEIGHT: 67 IN

## 2023-05-11 PROCEDURE — 73030 X-RAY EXAM OF SHOULDER: CPT | Mod: LT

## 2023-05-11 PROCEDURE — 20610 DRAIN/INJ JOINT/BURSA W/O US: CPT | Mod: LT

## 2023-05-12 ENCOUNTER — RESULT REVIEW (OUTPATIENT)
Age: 58
End: 2023-05-12

## 2023-05-12 ENCOUNTER — APPOINTMENT (OUTPATIENT)
Dept: NEUROLOGY | Facility: CLINIC | Age: 58
End: 2023-05-12
Payer: COMMERCIAL

## 2023-05-12 VITALS
DIASTOLIC BLOOD PRESSURE: 77 MMHG | SYSTOLIC BLOOD PRESSURE: 127 MMHG | WEIGHT: 187 LBS | HEART RATE: 78 BPM | HEIGHT: 67 IN | BODY MASS INDEX: 29.35 KG/M2

## 2023-05-12 PROCEDURE — 99214 OFFICE O/P EST MOD 30 MIN: CPT

## 2023-05-12 RX ORDER — METRONIDAZOLE 7.5 MG/G
0.75 GEL VAGINAL
Qty: 1 | Refills: 0 | Status: DISCONTINUED | COMMUNITY
Start: 2022-12-19 | End: 2023-05-12

## 2023-05-12 RX ORDER — TAMSULOSIN HYDROCHLORIDE 0.4 MG/1
0.4 CAPSULE ORAL
Refills: 0 | Status: DISCONTINUED | COMMUNITY
End: 2023-05-12

## 2023-05-12 NOTE — ASSESSMENT
[FreeTextEntry1] : Assessment/Plan:\par  57 year old female w/ hx of relapsing multiple sclerosis, dx'd 2015, and has been on Tysabri since 2015. She has been clinically and radiologically stable. Her JCV ab is indeterminate/low positive. \par \par Reviewed the risk of PML with patient at length. Reviewed PML risk stratification table. She wishes to continue therapy at this time. Addressed options of switching to Ocrelizumab or de-escalating therapy. Will continue to discuss at future visit.\par \par Plan: \par 1. Diagnostic Plan/Imaging: Repeat MRI brain w/w/o contrast in August 2023 (6 month follow up scan, + JCV ab)\par Will need to obtain prior MRI disc for comparison with recent scan. \par \par 2. Disease Modifying therapy plan:\par Continue Tysabri 300 mg IV infusion every 6 weeks\par JCV antibody titer with every infusion\par JCV antibody highest was 0.48 in 6/2022\par \par \par 3. Symptomatic therapy plan:\par () Fatigue: Will continue to monitor. Discussed no pharmacological measures to manage fatigue. \par () Spasticity: Occasional, will continue to monitor.\par () Neuropathic pain: On gabapentin 600 mg qhs. \par () Bladder Dysfunction: She has neurogenic bladder with incomplete emptying, on tamsulosin, symptoms helping. Continue to follow with urologist. \par () Vitamin D3 and B12 supplementations \par \par \par Return to clinic 3 months\par \par The above plan was discussed with TRAN OROZCO in great detail. TRAN OROZCO verbalized understanding and agrees with plan as detailed above. Patient was provided education and counselling on current diagnosis/symptoms, diagnostic work up, treatment options and potential side effects of any prescribed therapy/therapies. She was advised to call our clinic at [phone] for any new or worsening symptoms, or with any questions or concerns. In case of acute onset of neurological symptoms or worsening presentation, patient was advised to present to nearest emergency room for further evaluation. TRAN OROZCO expressed understanding and all her questions/concerns were addressed.\par \par Mercedes Ward M.D

## 2023-05-12 NOTE — HISTORY OF PRESENT ILLNESS
[FreeTextEntry1] : INTERIM HX 05/12/2023: JCV ab 3'23: 0.30 (indeterminate)\par MRI brain and C spine 2/14/2023 reviewed- (prior scans not uploaded, but compared w/ report)-  No enhancing lesions. Low disease burden in brain, a single left sided C3 cord lesion. (likely stable)\par She has been well. \par L shoulder pain x 2 months, worse with movements. Seeing Ortho, got cortisone shot helped.\par \par INTERIM HX 01/30/2023: Received Tysabri infusion on 1/18/2023. JCV ab test completed last week, results pending. No new MS symptom. Needs new urologist. \par \par HPI (initial visit Oct 21, 2022)- TRAN OROZCO is a 57 year old right handed woman w/ hx Raynaud with SICCA, HLD, Multilevel spondylosis, referred to neurology for hx of Multiple sclerosis. \par \par In Feb 2015, she woke up one morning and could not feel the water on the left shoulder, numbness spread down her left arm. She was diagnosed with MS in April 2015 by neurologist in [place]. She was treated with IVMP. She had MRI, LP and EMG and ultimately diagnosed with MS. She has been followed by Dr. Panchal at [facility] and is currently on Tysabri every 6 weeks, she has been Tysabri since 2015. \par \par She has been getting brain MRI w/w/o contrast every 6 months. JCV ab level every month. She reports her MRI imaging have all been stable. No relapses on Tysabri. JCV antibody highest was 0.48 in 6/2022, most recent level on 9/14/2022 was indeterminate at 0.38. \par \par Day to day symptoms:- reduced sensation over left hand, 50% improved from initial relapse. \par \par She has 6 children, oldest daughter lives in [place]. She recently became a grandma.\par \par She has neurogenic bladder with incomplete emptying, on tamsulosin, symptoms helping. \par \par

## 2023-05-12 NOTE — DATA REVIEWED
[de-identified] : \par MRI brain and C spine 2/14/2023 reviewed- (prior scans not uploaded, but compared w/ report)-  No enhancing lesions. Low disease burden in brain, a single left sided C3 cord lesion.\par \par MRI brain w/w/o 8/31/2022- Stable compared to 3/2022. Reviewed images on patients [facility] portal. Left ovoid parietal lobe lesion noted. \par MRI C spine w/w/o 3/2020- Left sided cord lesion at C2-C3. non enhancing. \par MRI T w/w/o 3/2020- no cord lesions

## 2023-05-12 NOTE — PHYSICAL EXAM
[FreeTextEntry1] : PHYSICAL EXAM\par Constitutional: Alert, no acute distress \par Psychiatric: appropriate affect and mood\par Pulmonary: No respiratory distress, stable on room air\par \par NEUROLOGICAL EXAM\par Mental status: The patient is alert, attentive, and conversational memory intact\par Speech/language: Clear and fluent with intact comprehension\par Cranial nerves:\par CN II: Visual fields are full to confrontation. Pupil size equal and briskly reactive to light. \par CN III, IV, VI: EOMI, no nystagmus, no ptosis\par CN V: Facial sensation is intact to pinprick in all 3 divisions bilaterally.\par CN VII: Face is symmetric with normal eye closure and smile.\par CN VII: Hearing is normal to rubbing fingers\par CN IX, X: Palate elevates symmetrically. Phonation is normal.\par CN XI: Head turning and shoulder shrug are intact\par CN XII: Tongue is midline with normal movements and no atrophy.\par Motor: Strength is full bilaterally. 5/5 muscle power in bilateral UE and LE.\par Reflexes: Reflexes are 2+ and symmetric at the biceps, knees, and ankles. Plantar responses are flexor.\par Sensory: Reduced sensation to light touch reduced over LUE\par Coordination:. There is no dysmetria on finger-to-nose or HTS. \par Gait/Stance: Posture is normal. Gait is steady with normal steps, base, arm swing, and turning. Mild imbalance on tandem gait and tandem Romberg.

## 2023-05-13 ENCOUNTER — LABORATORY RESULT (OUTPATIENT)
Age: 58
End: 2023-05-13

## 2023-05-13 LAB
ALBUMIN SERPL ELPH-MCNC: 4.3 G/DL
ALP BLD-CCNC: 59 U/L
ALT SERPL-CCNC: 6 U/L
ANION GAP SERPL CALC-SCNC: 13 MMOL/L
AST SERPL-CCNC: 11 U/L
BASOPHILS # BLD AUTO: 0.05 K/UL
BASOPHILS NFR BLD AUTO: 0.6 %
BILIRUB SERPL-MCNC: 0.3 MG/DL
BUN SERPL-MCNC: 15 MG/DL
CALCIUM SERPL-MCNC: 9.4 MG/DL
CHLORIDE SERPL-SCNC: 111 MMOL/L
CO2 SERPL-SCNC: 20 MMOL/L
CREAT SERPL-MCNC: 0.85 MG/DL
EGFR: 79 ML/MIN/1.73M2
EOSINOPHIL # BLD AUTO: 0.14 K/UL
EOSINOPHIL NFR BLD AUTO: 1.8 %
GLUCOSE SERPL-MCNC: 92 MG/DL
HCT VFR BLD CALC: 39.5 %
HGB BLD-MCNC: 13.3 G/DL
IMM GRANULOCYTES NFR BLD AUTO: 0.4 %
LYMPHOCYTES # BLD AUTO: 2.93 K/UL
LYMPHOCYTES NFR BLD AUTO: 37.7 %
MAN DIFF?: NORMAL
MCHC RBC-ENTMCNC: 29.4 PG
MCHC RBC-ENTMCNC: 33.7 GM/DL
MCV RBC AUTO: 87.4 FL
MONOCYTES # BLD AUTO: 0.55 K/UL
MONOCYTES NFR BLD AUTO: 7.1 %
NEUTROPHILS # BLD AUTO: 4.08 K/UL
NEUTROPHILS NFR BLD AUTO: 52.4 %
PLATELET # BLD AUTO: 241 K/UL
POTASSIUM SERPL-SCNC: 4.8 MMOL/L
PROT SERPL-MCNC: 6.3 G/DL
RBC # BLD: 4.52 M/UL
RBC # FLD: 14.7 %
SODIUM SERPL-SCNC: 143 MMOL/L
WBC # FLD AUTO: 7.78 K/UL

## 2023-05-15 ENCOUNTER — TRANSCRIPTION ENCOUNTER (OUTPATIENT)
Age: 58
End: 2023-05-15

## 2023-05-16 ENCOUNTER — TRANSCRIPTION ENCOUNTER (OUTPATIENT)
Age: 58
End: 2023-05-16

## 2023-05-22 ENCOUNTER — TRANSCRIPTION ENCOUNTER (OUTPATIENT)
Age: 58
End: 2023-05-22

## 2023-05-22 LAB
JCV INDEX: 0.26
STRATIFY JCV ANTIBODY: ABNORMAL

## 2023-05-22 NOTE — PROCEDURE
[de-identified] : Indication:  Impingement of the Left Shoulder\par \par Consent: \par At this time, I have recommended an injection to the left shoulder.  The risks and benefits of the procedure were discussed with the patient in detail.  Upon verbal consent of the patient, we proceeded with the injection as noted below.  \par \par Procedure:  \par After a sterile prep, the patient underwent an injection of 9 cc of 1% Lidocaine (10mg/mL) without epinephrine and 1 cc of Kenalog (40mg/mL) into the left shoulder.  The patient tolerated the procedure well.  There were no complications.  \par \par : [employer]par Drug name:     Triamcinolone Acetonide Injectable Suspension USP\par NDC #:            7144-9818-43\par Lot #:              [id number]\par Expiration:      01/2024

## 2023-05-22 NOTE — PHYSICAL EXAM
[de-identified] : Right Shoulder: \par Range of Motion in Degrees:   	\par    	                        Claimant:          Normal:  	\par Abduction (Active)  	180  	180 degrees  	\par Abduction (Passive)  	180  	180 degrees  	\par Forward elevation (Active):  	180  	180 degrees  	\par Forward elevation (Passive):  180  	180 degrees  	\par External rotation (Active):  	45  	45 degrees  	\par External rotation (Passive):  	45  	45 degrees  	\par Internal rotation (Active):  	L-1  	L-1  	\par Internal rotation (Passive):  	L-1  	L-1  	\par \par No motor weakness to internal rotation, external rotation or abduction in the scapular plane.  Negative crank test.  Negative O’Christofer’s test.  Negative Speed’s test. Negative Yergason’s test.  Negative cross arm test.  No tenderness to palpation at the AC joint. Negative Hawkin’s sign.  Negative Neer’s sign.  Negative apprehension. Negative sulcus sign.  No gross neurological or vascular deficits distally.  Skin is intact.  No rashes, scars or lesions. 2+ radial and ulnar pulses. No extra-articular swelling or tenderness. \par \par Left Shoulder:  \par Range of Motion in Degrees:	\par 	                                  Claimant:	Normal:	\par Abduction (Active)	                  180	               180 degrees	\par Abduction (Passive)	  180	               180 degrees	\par Forward elevation (Active):	  180	               180 degrees	\par Forward elevation (Passive):	  180	               180 degrees	\par External rotation (Active):	   45	               45 degrees	\par External rotation (Passive):	   45	               45 degrees	\par Internal rotation (Active):	   L-1	               L-1	\par Internal rotation (Passive):	   L-1	               L-1	\par  \par No motor weakness to internal rotation, external rotation or abduction in the scapular plane.  Negative crank test.  Negative O’Christofer’s test.  Negative Speed’s test. Negative Yergason’s test.  Negative cross arm test.  No tenderness to palpation at the AC joint. Positive Hawkin’s sign.  Positive Neer’s sign. Negative apprehension. Negative sulcus sign.  No gross neurological or vascular deficits distally.  Skin is intact.  No rashes, scars or lesions. 2+ radial and ulnar pulses. No extra-articular swelling or tenderness.\par   [de-identified] : Ambulating with a normal gait. Station: Normal.  [de-identified] : Appearance:  Well-developed, well-nourished female in no acute distress.\par   [de-identified] : Radiographs, which were taken in the office today, two views of the left shoulder, show no obvious osseous abnormality.

## 2023-05-22 NOTE — HISTORY OF PRESENT ILLNESS
[8] : the ailment interference is 8/10 [3] : the ailment interference is 3/10 [(Does not interfere) 0] : the ailment interference is 0/10 (does not interfere) [2] : the ailment interference is 2/10 [de-identified] : The patient comes in today with complaints of pain to her left arm. It has been going on for the last several months and it is getting worse recently. The patient states the onset/injury occurred in March of 2023. This injury is not work related or due to an automobile accident. The patient states the pain is intermittent.  The patient describes the pain as sharp, achy and shooting. The patient notes rest makes her symptoms better, while moving the arm behind her back or above her had makes her symptoms worse. The patient indicates a pain level of 5 on a pain scale of 0-10.   [] : No

## 2023-05-22 NOTE — DISCUSSION/SUMMARY
[de-identified] : At this time, due to impingement of the left shoulder, I recommend ice and elevation. She will be reassessed in three to four weeks.

## 2023-05-22 NOTE — REVIEW OF SYSTEMS
[Joint Pain] : joint pain [Recent Weight Gain (___ Lbs)] : recent ~M [unfilled] lbs weight gain [Muscle Weakness] : muscle weakness [Negative] : Heme/Lymph

## 2023-05-22 NOTE — ADDENDUM
[FreeTextEntry1] : This note was written by Afsaneh Bhatia on 05/22/2023 acting as a scribe for MISTY BERNABE III, MD

## 2023-05-22 NOTE — CONSULT LETTER
[Dear  ___] : Dear  [unfilled], [FreeTextEntry1] : \par I had the pleasure of evaluating your patient, Lora Sal.\par \par Thank you very much for allowing me to participate in the care of this patient.\par \par If you have any questions, please do not hesitate to contact me.\par \par Sincerely,\par \par \par Kamran Salinas III, MD\par [facility]/sg

## 2023-05-24 ENCOUNTER — APPOINTMENT (OUTPATIENT)
Dept: NEUROLOGY | Facility: CLINIC | Age: 58
End: 2023-05-24
Payer: COMMERCIAL

## 2023-05-24 PROCEDURE — 96413 CHEMO IV INFUSION 1 HR: CPT

## 2023-05-25 ENCOUNTER — APPOINTMENT (OUTPATIENT)
Dept: ORTHOPEDIC SURGERY | Facility: CLINIC | Age: 58
End: 2023-05-25
Payer: COMMERCIAL

## 2023-05-25 VITALS
DIASTOLIC BLOOD PRESSURE: 77 MMHG | SYSTOLIC BLOOD PRESSURE: 119 MMHG | WEIGHT: 187 LBS | BODY MASS INDEX: 29.35 KG/M2 | HEART RATE: 76 BPM | HEIGHT: 67 IN

## 2023-05-25 PROCEDURE — 99213 OFFICE O/P EST LOW 20 MIN: CPT

## 2023-06-05 NOTE — ADDENDUM
[FreeTextEntry1] : This note was written by Afsaneh Bhatia on 06/05/2023 acting as a scribe for MISTY BERNABE III, MD

## 2023-06-05 NOTE — PHYSICAL EXAM
[de-identified] : Left Shoulder:  \par Range of Motion in Degrees:	\par 	                                  Claimant:	Normal:	\par Abduction (Active)	                  180	               180 degrees	\par Abduction (Passive)	  180	               180 degrees	\par Forward elevation (Active):	  180	               180 degrees	\par Forward elevation (Passive):	  180	               180 degrees	\par External rotation (Active):	   45	               45 degrees	\par External rotation (Passive):	   45	               45 degrees	\par Internal rotation (Active):	   L-1	               L-1	\par Internal rotation (Passive):	   L-1	               L-1	\par  \par No motor weakness to internal rotation, external rotation or abduction in the scapular plane.  Negative crank test.  Negative O’Christofer’s test.  Negative Speed’s test. Negative Yergason’s test.  Negative cross arm test.  No tenderness to palpation at the AC joint. Positive Hawkin’s sign.  Positive Neer’s sign. Negative apprehension. Negative sulcus sign.  No gross neurological or vascular deficits distally.  Skin is intact.  No rashes, scars or lesions. 2+ radial and ulnar pulses. No extra-articular swelling or tenderness.\par   [de-identified] : Ambulating with a normal gait. [de-identified] : Appearance:  Well-developed, well-nourished female in no acute distress.\par

## 2023-06-05 NOTE — DISCUSSION/SUMMARY
[de-identified] : At this time, due to impingement of the left shoulder, she will start on physical therapy. She will be reassessed in four to six weeks.

## 2023-07-06 ENCOUNTER — APPOINTMENT (OUTPATIENT)
Dept: NEUROLOGY | Facility: CLINIC | Age: 58
End: 2023-07-06
Payer: COMMERCIAL

## 2023-07-06 PROCEDURE — 96413 CHEMO IV INFUSION 1 HR: CPT

## 2023-07-13 ENCOUNTER — APPOINTMENT (OUTPATIENT)
Dept: ORTHOPEDIC SURGERY | Facility: CLINIC | Age: 58
End: 2023-07-13

## 2023-07-24 ENCOUNTER — APPOINTMENT (OUTPATIENT)
Dept: ORTHOPEDIC SURGERY | Facility: CLINIC | Age: 58
End: 2023-07-24
Payer: COMMERCIAL

## 2023-07-24 PROCEDURE — 99441: CPT

## 2023-08-01 ENCOUNTER — APPOINTMENT (OUTPATIENT)
Dept: MRI IMAGING | Facility: CLINIC | Age: 58
End: 2023-08-01
Payer: COMMERCIAL

## 2023-08-01 ENCOUNTER — OUTPATIENT (OUTPATIENT)
Dept: OUTPATIENT SERVICES | Facility: HOSPITAL | Age: 58
LOS: 1 days | End: 2023-08-01
Payer: COMMERCIAL

## 2023-08-01 DIAGNOSIS — M75.42 IMPINGEMENT SYNDROME OF LEFT SHOULDER: ICD-10-CM

## 2023-08-01 DIAGNOSIS — Z41.9 ENCOUNTER FOR PROCEDURE FOR PURPOSES OTHER THAN REMEDYING HEALTH STATE, UNSPECIFIED: Chronic | ICD-10-CM

## 2023-08-01 DIAGNOSIS — Z90.89 ACQUIRED ABSENCE OF OTHER ORGANS: Chronic | ICD-10-CM

## 2023-08-01 DIAGNOSIS — Z98.890 OTHER SPECIFIED POSTPROCEDURAL STATES: Chronic | ICD-10-CM

## 2023-08-01 PROCEDURE — 73221 MRI JOINT UPR EXTREM W/O DYE: CPT

## 2023-08-01 PROCEDURE — 73221 MRI JOINT UPR EXTREM W/O DYE: CPT | Mod: 26,LT

## 2023-08-07 ENCOUNTER — APPOINTMENT (OUTPATIENT)
Dept: ORTHOPEDIC SURGERY | Facility: CLINIC | Age: 58
End: 2023-08-07
Payer: COMMERCIAL

## 2023-08-07 DIAGNOSIS — M75.42 IMPINGEMENT SYNDROME OF LEFT SHOULDER: ICD-10-CM

## 2023-08-07 PROCEDURE — 99441: CPT

## 2023-08-09 ENCOUNTER — APPOINTMENT (OUTPATIENT)
Dept: ORTHOPEDIC SURGERY | Facility: CLINIC | Age: 58
End: 2023-08-09
Payer: COMMERCIAL

## 2023-08-09 VITALS
SYSTOLIC BLOOD PRESSURE: 107 MMHG | WEIGHT: 187 LBS | HEIGHT: 67 IN | BODY MASS INDEX: 29.35 KG/M2 | HEART RATE: 76 BPM | DIASTOLIC BLOOD PRESSURE: 75 MMHG

## 2023-08-09 DIAGNOSIS — T69.9XXA EFFECT OF REDUCED TEMPERATURE, UNSPECIFIED, INITIAL ENCOUNTER: ICD-10-CM

## 2023-08-09 DIAGNOSIS — M75.00 ADHESIVE CAPSULITIS OF UNSPECIFIED SHOULDER: ICD-10-CM

## 2023-08-09 PROCEDURE — 20610 DRAIN/INJ JOINT/BURSA W/O US: CPT | Mod: LT

## 2023-08-10 PROBLEM — M75.00 FROZEN SHOULDER SYNDROME: Status: ACTIVE | Noted: 2023-08-09

## 2023-08-10 NOTE — PROCEDURE
[de-identified] : Consent:  At this time, I have recommended a subacromial injection into the left shoulder.  The risks and benefits of the procedure were discussed with the patient in detail.  Upon verbal consent of the patient, we proceeded with the injection as noted below.    Indications: Frozen shoulder, left                    Impingement, left shoulder  : [employer] Drug name: Triamcinolone Acetonide Injectable Suspension USP NDC#: 0143-9577-10 Lot#: [id number] Expiration date: 01/24  Procedure: After a sterile prep, the patient underwent a subacromial injection of 9 cc of 1% Lidocaine (10mg/mL) without epinephrine and 1 cc of Kenalog (40 mg/mL) into the left shoulder.  The patient tolerated the procedure well.  There were no complications.

## 2023-08-10 NOTE — DISCUSSION/SUMMARY
[de-identified] : The patient presents with a frozen shoulder and impingement, left.  The patient was given an injection as noted above.  At this time I recommend ice and elevation, renewed therapy focusing on the frozen shoulder.  She will be reassessed once that is done.

## 2023-08-10 NOTE — ADDENDUM
[FreeTextEntry1] : This note was written by Ada Kc on 08/10/2023 acting as scribe for Kamran Salinas III, MD

## 2023-08-10 NOTE — PHYSICAL EXAM
[de-identified] : Left Shoulder:   Shoulder: Range of Motion in Degrees:   	 	                                  Claimant:	Normal:	 Abduction (Active)	                  120	               180 degrees	 Abduction (Passive)	  120	               180 degrees	 Forward elevation (Active):	  120	               180 degrees	 Forward elevation (Passive):	  120	               180 degrees	 External rotation (Active):	   30	                       45 degrees	 External rotation (Passive):	    30	               45 degrees	 Internal rotation (Active):	       To her side	               L-1	 Internal rotation (Passive):     To her side	               L-1	  Pain throughout range of motion, more significant at the extremes.  No motor weakness to internal rotation, external rotation or abduction in the scapular plane.  Negative crank test.  Negative [place]'s test.  Negative Speeds test.  Negative Yergason's test.  Negative cross arm test.  No tenderness to palpation at the AC joint. Positive Doan sign.  Positive Neer's sign.  Negative apprehension.  Negative sulcus sign.  No gross neurological or vascular deficits distally.  Skin is intact.  No rashes, scars or lesions. 2+ radial and ulnar pulses. No extra-articular swelling or tenderness.   [de-identified] : Gait: Normal    Station: Normal  [de-identified] : Appearance: Well-developed, well-nourished female  in no acute distress.

## 2023-08-13 PROBLEM — M75.42 IMPINGEMENT SYNDROME OF LEFT SHOULDER: Status: ACTIVE | Noted: 2023-08-07

## 2023-08-16 ENCOUNTER — APPOINTMENT (OUTPATIENT)
Dept: MRI IMAGING | Facility: CLINIC | Age: 58
End: 2023-08-16
Payer: COMMERCIAL

## 2023-08-16 ENCOUNTER — OUTPATIENT (OUTPATIENT)
Dept: OUTPATIENT SERVICES | Facility: HOSPITAL | Age: 58
LOS: 1 days | End: 2023-08-16
Payer: COMMERCIAL

## 2023-08-16 DIAGNOSIS — Z98.890 OTHER SPECIFIED POSTPROCEDURAL STATES: Chronic | ICD-10-CM

## 2023-08-16 DIAGNOSIS — Z41.9 ENCOUNTER FOR PROCEDURE FOR PURPOSES OTHER THAN REMEDYING HEALTH STATE, UNSPECIFIED: Chronic | ICD-10-CM

## 2023-08-16 DIAGNOSIS — Z90.89 ACQUIRED ABSENCE OF OTHER ORGANS: Chronic | ICD-10-CM

## 2023-08-16 DIAGNOSIS — G35 MULTIPLE SCLEROSIS: ICD-10-CM

## 2023-08-16 PROCEDURE — 70553 MRI BRAIN STEM W/O & W/DYE: CPT | Mod: 26

## 2023-08-16 PROCEDURE — 76377 3D RENDER W/INTRP POSTPROCES: CPT | Mod: 26

## 2023-08-16 PROCEDURE — 76377 3D RENDER W/INTRP POSTPROCES: CPT

## 2023-08-16 PROCEDURE — A9585: CPT

## 2023-08-16 PROCEDURE — 70553 MRI BRAIN STEM W/O & W/DYE: CPT

## 2023-08-17 ENCOUNTER — APPOINTMENT (OUTPATIENT)
Dept: NEUROLOGY | Facility: CLINIC | Age: 58
End: 2023-08-17
Payer: COMMERCIAL

## 2023-08-17 PROCEDURE — 96413 CHEMO IV INFUSION 1 HR: CPT

## 2023-08-21 ENCOUNTER — TRANSCRIPTION ENCOUNTER (OUTPATIENT)
Age: 58
End: 2023-08-21

## 2023-08-22 ENCOUNTER — LABORATORY RESULT (OUTPATIENT)
Age: 58
End: 2023-08-22

## 2023-08-26 ENCOUNTER — NON-APPOINTMENT (OUTPATIENT)
Age: 58
End: 2023-08-26

## 2023-08-31 ENCOUNTER — APPOINTMENT (OUTPATIENT)
Dept: NEUROLOGY | Facility: CLINIC | Age: 58
End: 2023-08-31
Payer: COMMERCIAL

## 2023-08-31 VITALS
HEIGHT: 67 IN | SYSTOLIC BLOOD PRESSURE: 120 MMHG | BODY MASS INDEX: 29.35 KG/M2 | DIASTOLIC BLOOD PRESSURE: 80 MMHG | WEIGHT: 187 LBS | HEART RATE: 103 BPM

## 2023-08-31 PROCEDURE — 99214 OFFICE O/P EST MOD 30 MIN: CPT

## 2023-08-31 NOTE — HISTORY OF PRESENT ILLNESS
[FreeTextEntry1] : INTERIM HX 08/31/2023: MRI brain w/w/o 8/16/2023 stable (c/w 2/2023). Pt stable. Open to switching DMT at this time.   INTERIM HX 05/12/2023: JCV ab 3'23: 0.30 (indeterminate) MRI brain and C spine 2/14/2023 reviewed- (prior scans not uploaded, but compared w/ report)-  No enhancing lesions. Low disease burden in brain, a single left sided C3 cord lesion. (likely stable) She has been well.  L shoulder pain x 2 months, worse with movements. Seeing Ortho, got cortisone shot helped.  INTERIM HX 01/30/2023: Received Tysabri infusion on 1/18/2023. JCV ab test completed last week, results pending. No new MS symptom. Needs new urologist.   HPI (initial visit Oct 21, 2022)- TRAN OROZCO is a 57 year old right handed woman w/ hx Raynaud with SICCA, HLD, Multilevel spondylosis, referred to neurology for hx of Multiple sclerosis.   In Feb 2015, she woke up one morning and could not feel the water on the left shoulder, numbness spread down her left arm. She was diagnosed with MS in April 2015 by neurologist in [place]. She was treated with IVMP. She had MRI, LP and EMG and ultimately diagnosed with MS. She has been followed by Dr. Panchal at [facility] and is currently on Tysabri every 6 weeks, she has been Tysabri since 2015.   She has been getting brain MRI w/w/o contrast every 6 months. JCV ab level every month. She reports her MRI imaging have all been stable. No relapses on Tysabri. JCV antibody highest was 0.48 in 6/2022, most recent level on 9/14/2022 was indeterminate at 0.38.   Day to day symptoms:- reduced sensation over left hand, 50% improved from initial relapse.   She has 6 children, oldest daughter lives in [place]. She recently became a grandma.  She has neurogenic bladder with incomplete emptying, on tamsulosin, symptoms helping.

## 2023-08-31 NOTE — PHYSICAL EXAM
[FreeTextEntry1] : PHYSICAL EXAM Constitutional: Alert, no acute distress  Psychiatric: appropriate affect and mood Pulmonary: No respiratory distress, stable on room air  NEUROLOGICAL EXAM Mental status: The patient is alert, attentive, and conversational memory intact Speech/language: Clear and fluent with intact comprehension Cranial nerves: CN II: Visual fields are full to confrontation. Pupil size equal and briskly reactive to light.  CN III, IV, VI: EOMI, no nystagmus, no ptosis CN V: Facial sensation is intact to pinprick in all 3 divisions bilaterally. CN VII: Face is symmetric with normal eye closure and smile. CN VII: Hearing is normal to rubbing fingers CN IX, X: Palate elevates symmetrically. Phonation is normal. CN XI: Head turning and shoulder shrug are intact CN XII: Tongue is midline with normal movements and no atrophy. Motor: Strength is full bilaterally. 5/5 muscle power in bilateral UE and LE. Reflexes: Reflexes are 2+ and symmetric at the biceps, knees, and ankles. Plantar responses are flexor. Sensory: Reduced sensation to light touch reduced over LUE Coordination:. There is no dysmetria on finger-to-nose or HTS.  Gait/Stance: Posture is normal. Gait is steady with normal steps, base, arm swing, and turning. Mild imbalance on tandem gait and tandem Romberg.

## 2023-08-31 NOTE — ASSESSMENT
[FreeTextEntry1] : Assessment/Plan:  57 year old female w/ hx of relapsing multiple sclerosis, dx'd 2015, and has been on Tysabri since 2015. She has been clinically and radiologically stable. Her JCV ab is indeterminate/low positive.   We have discussed her risk of PML and reviewed PML risk stratification table. Given that she is JCV ab + (low titer) and has now been on Tysabri for ~ 8 years, we discussed considering switching to different DMT at this time. Discussed the option of switching to Ocrevus, which is equally as effective as Tysabri. Patient comfortable with this option.   Plan:  1. Diagnostic Plan/Imaging: Repeat MRI brain w/w/o contrast in Feb 2024 (6 month follow up scan, + JCV ab). Will also order MRI C spine for radiological stability in 2/2024. Once on Ocrevus, will no longer perform q6 monthly brain MRI's and will plan for annual.   2. Disease Modifying therapy plan: Switch Ocrevus (start form signed). Last Tysabri infusion 8/17, cancel next infusion. Will plan to start Ocrevus 6 weeks from last Tysabri infusion.  MS DMT: Start Ocrevus -Ocrevus screening labs ordered -Monitoring Ocrevus labs (CBC with diff, LFT) every 6 months while on therapy For Ocrevus we discussed the most commonly seen potential side effects in the clinical trials of upper respiratory infections, infusion reactions, skin infections, and the fact that there were 6 cases of breast cancer in the Ocrevus treated group as compared to none in the Rebif and placebo groups.  We discussed the breast cancer data in details as it is discussed in the Ocrevus prescribing information.  Counseled on avoiding vaccination with live virus vaccine   3. Symptomatic therapy plan: () Fatigue: Will continue to monitor. Discussed no pharmacological measures to manage fatigue.  () Spasticity: Occasional, will continue to monitor. () Neuropathic pain: On gabapentin 600 mg qhs.  () Bladder Dysfunction: She has neurogenic bladder with incomplete emptying, on tamsulosin, symptoms helping. Continue to follow with urologist.  () Vitamin D3 and B12 supplementations    Return to clinic 3 months  Mercedes Ward M.D

## 2023-08-31 NOTE — DATA REVIEWED
[de-identified] : MRI brain w/w/o 8/16/2023 stable (c/w 2/2023).  MRI brain and C spine 2/14/2023 reviewed- (prior scans not uploaded, but compared w/ report)-  No enhancing lesions. Low disease burden in brain, a single left sided C3 cord lesion.  MRI brain w/w/o 8/31/2022- Stable compared to 3/2022. Reviewed images on patients [facility] portal. Left ovoid parietal lobe lesion noted.  MRI C spine w/w/o 3/2020- Left sided cord lesion at C2-C3. non enhancing.  MRI T w/w/o 3/2020- no cord lesions

## 2023-09-01 LAB
JCV INDEX: 0.39
STRATIFY JCV ANTIBODY: ABNORMAL

## 2023-09-05 ENCOUNTER — TRANSCRIPTION ENCOUNTER (OUTPATIENT)
Age: 58
End: 2023-09-05

## 2023-09-05 ENCOUNTER — LABORATORY RESULT (OUTPATIENT)
Age: 58
End: 2023-09-05

## 2023-09-06 ENCOUNTER — TRANSCRIPTION ENCOUNTER (OUTPATIENT)
Age: 58
End: 2023-09-06

## 2023-09-06 LAB
25(OH)D3 SERPL-MCNC: 53.2 NG/ML
ALBUMIN SERPL ELPH-MCNC: 4.2 G/DL
ALP BLD-CCNC: 61 U/L
ALT SERPL-CCNC: 8 U/L
AST SERPL-CCNC: 13 U/L
BILIRUB DIRECT SERPL-MCNC: 0.1 MG/DL
BILIRUB INDIRECT SERPL-MCNC: 0.2 MG/DL
BILIRUB SERPL-MCNC: 0.4 MG/DL
BUN SERPL-MCNC: 11 MG/DL
CREAT SERPL-MCNC: 0.93 MG/DL
DEPRECATED KAPPA LC FREE/LAMBDA SER: 1.78 RATIO
EGFR: 71 ML/MIN/1.73M2
HBV CORE IGG+IGM SER QL: NONREACTIVE
HBV CORE IGM SER QL: NONREACTIVE
HBV SURFACE AB SER QL: NONREACTIVE
HBV SURFACE AG SER QL: NONREACTIVE
IGA SER QL IEP: 116 MG/DL
IGG SER QL IEP: 580 MG/DL
IGM SER QL IEP: 32 MG/DL
KAPPA LC CSF-MCNC: 0.68 MG/DL
KAPPA LC SERPL-MCNC: 1.21 MG/DL
PROT SERPL-MCNC: 5.9 G/DL
VZV AB TITR SER: POSITIVE
VZV IGG SER IF-ACNC: >4000 INDEX

## 2023-09-08 LAB
M TB IFN-G BLD-IMP: ABNORMAL
QUANTIFERON TB PLUS MITOGEN MINUS NIL: 0.41 IU/ML
QUANTIFERON TB PLUS NIL: 0.02 IU/ML
QUANTIFERON TB PLUS TB1 MINUS NIL: 0 IU/ML
QUANTIFERON TB PLUS TB2 MINUS NIL: 0 IU/ML
VZV IGM SER IF-ACNC: <0.91 INDEX

## 2023-09-15 LAB
JCV INDEX: 0.33
STRATIFY JCV ANTIBODY: ABNORMAL

## 2023-09-21 LAB
M TB IFN-G BLD-IMP: NEGATIVE
QUANTIFERON TB PLUS MITOGEN MINUS NIL: >10 IU/ML
QUANTIFERON TB PLUS NIL: 0.04 IU/ML
QUANTIFERON TB PLUS TB1 MINUS NIL: 0.01 IU/ML
QUANTIFERON TB PLUS TB2 MINUS NIL: 0.01 IU/ML

## 2023-09-26 ENCOUNTER — RX RENEWAL (OUTPATIENT)
Age: 58
End: 2023-09-26

## 2023-10-04 ENCOUNTER — APPOINTMENT (OUTPATIENT)
Dept: NEUROLOGY | Facility: CLINIC | Age: 58
End: 2023-10-04

## 2023-10-04 RX ORDER — OCRELIZUMAB 300 MG/10ML
300 INJECTION INTRAVENOUS AS DIRECTED
Qty: 2 | Refills: 0 | Status: ACTIVE | COMMUNITY
Start: 2023-10-04 | End: 1900-01-01

## 2023-10-06 ENCOUNTER — APPOINTMENT (OUTPATIENT)
Dept: OBGYN | Facility: CLINIC | Age: 58
End: 2023-10-06
Payer: COMMERCIAL

## 2023-10-06 VITALS
SYSTOLIC BLOOD PRESSURE: 120 MMHG | WEIGHT: 185 LBS | HEIGHT: 67 IN | DIASTOLIC BLOOD PRESSURE: 70 MMHG | BODY MASS INDEX: 29.03 KG/M2

## 2023-10-06 DIAGNOSIS — Z01.419 ENCOUNTER FOR GYNECOLOGICAL EXAMINATION (GENERAL) (ROUTINE) W/OUT ABNORMAL FINDINGS: ICD-10-CM

## 2023-10-06 LAB
CARD LOT #: NORMAL
CARD LOT EXP DATE: NORMAL
DATE COLLECTED: NORMAL
DATE COLLECTED: NORMAL
DEVELOPER LOT #: NORMAL
DEVELOPER LOT EXP DATE: NORMAL
HEMOCCULT 2: NEGATIVE
HEMOCCULT SP1 STL QL: NEGATIVE
QUALITY CONTROL: YES

## 2023-10-06 PROCEDURE — 99396 PREV VISIT EST AGE 40-64: CPT

## 2023-10-06 RX ORDER — ESTRADIOL 10 UG/1
10 TABLET VAGINAL DAILY
Qty: 34 | Refills: 3 | Status: ACTIVE | COMMUNITY
Start: 2023-10-06 | End: 1900-01-01

## 2023-10-06 RX ORDER — ESTRADIOL AND NORETHINDRONE ACETATE 1; .5 MG/1; MG/1
1-0.5 TABLET, FILM COATED ORAL DAILY
Qty: 90 | Refills: 3 | Status: ACTIVE | COMMUNITY
Start: 2022-10-03 | End: 1900-01-01

## 2023-10-09 LAB — HPV HIGH+LOW RISK DNA PNL CVX: NOT DETECTED

## 2023-10-11 LAB — CYTOLOGY CVX/VAG DOC THIN PREP: NORMAL

## 2023-10-12 RX ORDER — OCRELIZUMAB 300 MG/10ML
300 INJECTION INTRAVENOUS DAILY
Qty: 2 | Refills: 2 | Status: ACTIVE | COMMUNITY
Start: 2023-10-12 | End: 1900-01-01

## 2023-10-15 ENCOUNTER — NON-APPOINTMENT (OUTPATIENT)
Age: 58
End: 2023-10-15

## 2023-10-18 ENCOUNTER — APPOINTMENT (OUTPATIENT)
Dept: NEUROLOGY | Facility: CLINIC | Age: 58
End: 2023-10-18
Payer: COMMERCIAL

## 2023-10-18 PROCEDURE — 96415 CHEMO IV INFUSION ADDL HR: CPT

## 2023-10-18 PROCEDURE — 96413 CHEMO IV INFUSION 1 HR: CPT

## 2023-11-02 ENCOUNTER — APPOINTMENT (OUTPATIENT)
Dept: NEUROLOGY | Facility: CLINIC | Age: 58
End: 2023-11-02
Payer: COMMERCIAL

## 2023-11-02 PROCEDURE — 96413 CHEMO IV INFUSION 1 HR: CPT

## 2023-11-02 PROCEDURE — 96415 CHEMO IV INFUSION ADDL HR: CPT

## 2023-11-03 ENCOUNTER — APPOINTMENT (OUTPATIENT)
Dept: FAMILY MEDICINE | Facility: CLINIC | Age: 58
End: 2023-11-03
Payer: COMMERCIAL

## 2023-11-03 VITALS
BODY MASS INDEX: 29.03 KG/M2 | HEIGHT: 67 IN | WEIGHT: 185 LBS | DIASTOLIC BLOOD PRESSURE: 72 MMHG | SYSTOLIC BLOOD PRESSURE: 112 MMHG | TEMPERATURE: 97 F

## 2023-11-03 DIAGNOSIS — Z00.00 ENCOUNTER FOR GENERAL ADULT MEDICAL EXAMINATION W/OUT ABNORMAL FINDINGS: ICD-10-CM

## 2023-11-03 DIAGNOSIS — E78.00 PURE HYPERCHOLESTEROLEMIA, UNSPECIFIED: ICD-10-CM

## 2023-11-03 DIAGNOSIS — K59.00 CONSTIPATION, UNSPECIFIED: ICD-10-CM

## 2023-11-03 PROCEDURE — 99396 PREV VISIT EST AGE 40-64: CPT | Mod: 25

## 2023-11-03 PROCEDURE — 36415 COLL VENOUS BLD VENIPUNCTURE: CPT

## 2023-11-03 RX ORDER — NATALIZUMAB 300 MG/15ML
300 INJECTION INTRAVENOUS
Qty: 3 | Refills: 3 | Status: DISCONTINUED | COMMUNITY
Start: 2022-12-12 | End: 2023-11-03

## 2023-11-07 ENCOUNTER — TRANSCRIPTION ENCOUNTER (OUTPATIENT)
Age: 58
End: 2023-11-07

## 2023-11-07 LAB
ALBUMIN SERPL ELPH-MCNC: 4.5 G/DL
ALP BLD-CCNC: 68 U/L
ALT SERPL-CCNC: 40 U/L
ANION GAP SERPL CALC-SCNC: 12 MMOL/L
AST SERPL-CCNC: 21 U/L
BASOPHILS # BLD AUTO: 0.04 K/UL
BASOPHILS NFR BLD AUTO: 0.4 %
BILIRUB SERPL-MCNC: 0.3 MG/DL
BUN SERPL-MCNC: 11 MG/DL
CALCIUM SERPL-MCNC: 9.5 MG/DL
CHLORIDE SERPL-SCNC: 106 MMOL/L
CHOLEST SERPL-MCNC: 146 MG/DL
CO2 SERPL-SCNC: 24 MMOL/L
CREAT SERPL-MCNC: 0.95 MG/DL
CRP SERPL-MCNC: <3 MG/L
EGFR: 69 ML/MIN/1.73M2
EOSINOPHIL # BLD AUTO: 0.04 K/UL
EOSINOPHIL NFR BLD AUTO: 0.4 %
ERYTHROCYTE [SEDIMENTATION RATE] IN BLOOD BY WESTERGREN METHOD: 16 MM/HR
GLUCOSE SERPL-MCNC: 92 MG/DL
HCT VFR BLD CALC: 43.8 %
HDLC SERPL-MCNC: 62 MG/DL
HGB BLD-MCNC: 14.8 G/DL
IMM GRANULOCYTES NFR BLD AUTO: 0.2 %
LDLC SERPL CALC-MCNC: 65 MG/DL
LYMPHOCYTES # BLD AUTO: 1.42 K/UL
LYMPHOCYTES NFR BLD AUTO: 13.2 %
MAN DIFF?: NORMAL
MCHC RBC-ENTMCNC: 30.1 PG
MCHC RBC-ENTMCNC: 33.8 GM/DL
MCV RBC AUTO: 89 FL
MONOCYTES # BLD AUTO: 0.76 K/UL
MONOCYTES NFR BLD AUTO: 7.1 %
NEUTROPHILS # BLD AUTO: 8.47 K/UL
NEUTROPHILS NFR BLD AUTO: 78.7 %
NONHDLC SERPL-MCNC: 85 MG/DL
PLATELET # BLD AUTO: 318 K/UL
POTASSIUM SERPL-SCNC: 4.7 MMOL/L
PROT SERPL-MCNC: 6.5 G/DL
RBC # BLD: 4.92 M/UL
RBC # FLD: 14.1 %
SODIUM SERPL-SCNC: 142 MMOL/L
TRIGL SERPL-MCNC: 112 MG/DL
TSH SERPL-ACNC: 1.47 UIU/ML
WBC # FLD AUTO: 10.75 K/UL

## 2023-11-08 ENCOUNTER — TRANSCRIPTION ENCOUNTER (OUTPATIENT)
Age: 58
End: 2023-11-08

## 2023-11-08 DIAGNOSIS — D72.829 ELEVATED WHITE BLOOD CELL COUNT, UNSPECIFIED: ICD-10-CM

## 2023-11-09 ENCOUNTER — TRANSCRIPTION ENCOUNTER (OUTPATIENT)
Age: 58
End: 2023-11-09

## 2023-11-15 ENCOUNTER — APPOINTMENT (OUTPATIENT)
Dept: NEUROLOGY | Facility: CLINIC | Age: 58
End: 2023-11-15

## 2023-11-17 ENCOUNTER — APPOINTMENT (OUTPATIENT)
Dept: RADIOLOGY | Facility: CLINIC | Age: 58
End: 2023-11-17
Payer: COMMERCIAL

## 2023-11-17 ENCOUNTER — OUTPATIENT (OUTPATIENT)
Dept: OUTPATIENT SERVICES | Facility: HOSPITAL | Age: 58
LOS: 1 days | End: 2023-11-17
Payer: COMMERCIAL

## 2023-11-17 ENCOUNTER — APPOINTMENT (OUTPATIENT)
Dept: MAMMOGRAPHY | Facility: CLINIC | Age: 58
End: 2023-11-17
Payer: COMMERCIAL

## 2023-11-17 ENCOUNTER — RESULT REVIEW (OUTPATIENT)
Age: 58
End: 2023-11-17

## 2023-11-17 DIAGNOSIS — Z98.890 OTHER SPECIFIED POSTPROCEDURAL STATES: Chronic | ICD-10-CM

## 2023-11-17 DIAGNOSIS — Z01.419 ENCOUNTER FOR GYNECOLOGICAL EXAMINATION (GENERAL) (ROUTINE) WITHOUT ABNORMAL FINDINGS: ICD-10-CM

## 2023-11-17 DIAGNOSIS — Z41.9 ENCOUNTER FOR PROCEDURE FOR PURPOSES OTHER THAN REMEDYING HEALTH STATE, UNSPECIFIED: Chronic | ICD-10-CM

## 2023-11-17 PROCEDURE — 77080 DXA BONE DENSITY AXIAL: CPT | Mod: 26

## 2023-11-17 PROCEDURE — 77063 BREAST TOMOSYNTHESIS BI: CPT | Mod: 26

## 2023-11-17 PROCEDURE — 77063 BREAST TOMOSYNTHESIS BI: CPT

## 2023-11-17 PROCEDURE — 77080 DXA BONE DENSITY AXIAL: CPT

## 2023-11-17 PROCEDURE — 77067 SCR MAMMO BI INCL CAD: CPT

## 2023-11-17 PROCEDURE — 77067 SCR MAMMO BI INCL CAD: CPT | Mod: 26

## 2023-11-28 ENCOUNTER — TRANSCRIPTION ENCOUNTER (OUTPATIENT)
Age: 58
End: 2023-11-28

## 2023-11-29 ENCOUNTER — TRANSCRIPTION ENCOUNTER (OUTPATIENT)
Age: 58
End: 2023-11-29

## 2023-11-30 ENCOUNTER — TRANSCRIPTION ENCOUNTER (OUTPATIENT)
Age: 58
End: 2023-11-30

## 2023-12-10 ENCOUNTER — TRANSCRIPTION ENCOUNTER (OUTPATIENT)
Age: 58
End: 2023-12-10

## 2023-12-10 LAB
BASOPHILS # BLD AUTO: 0.07 K/UL
BASOPHILS NFR BLD AUTO: 1.1 %
EOSINOPHIL # BLD AUTO: 0.22 K/UL
EOSINOPHIL NFR BLD AUTO: 3.6 %
HCT VFR BLD CALC: 45.2 %
HGB BLD-MCNC: 15 G/DL
IMM GRANULOCYTES NFR BLD AUTO: 0.2 %
LYMPHOCYTES # BLD AUTO: 1.54 K/UL
LYMPHOCYTES NFR BLD AUTO: 25.3 %
MAN DIFF?: NORMAL
MCHC RBC-ENTMCNC: 29.8 PG
MCHC RBC-ENTMCNC: 33.2 GM/DL
MCV RBC AUTO: 89.7 FL
MONOCYTES # BLD AUTO: 0.56 K/UL
MONOCYTES NFR BLD AUTO: 9.2 %
NEUTROPHILS # BLD AUTO: 3.69 K/UL
NEUTROPHILS NFR BLD AUTO: 60.6 %
PLATELET # BLD AUTO: 267 K/UL
RBC # BLD: 5.04 M/UL
RBC # FLD: 14.5 %
WBC # FLD AUTO: 6.09 K/UL

## 2023-12-11 LAB
CRP SERPL-MCNC: <3 MG/L
ERYTHROCYTE [SEDIMENTATION RATE] IN BLOOD BY WESTERGREN METHOD: 14 MM/HR

## 2023-12-13 ENCOUNTER — APPOINTMENT (OUTPATIENT)
Dept: RHEUMATOLOGY | Facility: CLINIC | Age: 58
End: 2023-12-13
Payer: COMMERCIAL

## 2023-12-13 VITALS
HEART RATE: 70 BPM | SYSTOLIC BLOOD PRESSURE: 118 MMHG | DIASTOLIC BLOOD PRESSURE: 78 MMHG | HEIGHT: 67 IN | WEIGHT: 185 LBS | OXYGEN SATURATION: 98 % | TEMPERATURE: 97 F | BODY MASS INDEX: 29.03 KG/M2

## 2023-12-13 PROCEDURE — 99214 OFFICE O/P EST MOD 30 MIN: CPT

## 2023-12-13 RX ORDER — LACTOPEROXI/GLUC OXID/POT THIO
GUM MUCOUS MEMBRANE
Qty: 30 | Refills: 1 | Status: DISCONTINUED | COMMUNITY
Start: 2023-01-23 | End: 2023-12-13

## 2023-12-13 NOTE — HISTORY OF PRESENT ILLNESS
[FreeTextEntry1] : 58-year-old female, here for follow w/ hx of MS w/ her Neuro; +HLA-B27; multilevel spondylosis; reports of reports of raynaud's like symptoms in the hands >feet & sicca symptoms. States she did labs to review in detail and recent Dexa. Her Sjogren abs and Early Sjogren abs are Negative and she defers lip biopsy that she knows is gold standard for Sjogren. She reports of dry mouth since around May of 2021. Patient states she does not have a lot of cavities. States the dry mouth is intermittent. Patient states she notices dry eyes and told by her Optho, Dr. Leonard to use refresh. Denies any swelling or redness or warmth of any joints. States she had some lower back pain worse with lifting; better with resting. Denies any loss of bladder or bowel incontinence or saddle anesthesia. States she has history of a disc herniation on MRI in the past. States she has hx of Lt heel pain worse in AM and gets better as the day goes on and not much lately. Denies any Achillies pain and no swelling. States she can notice that her hands more than feet can turn white or blue in the cold and controlled w/ keeping them warm without any ulcers. Denies any fever/chills, hx of oral ulcers in the past and not now; no nasal or genital ulcers; no rashes and no skin thickening today, no infectious diarrhea or  symptoms at this time.  Her Dexa 11/17/23 is normal.

## 2023-12-13 NOTE — REASON FOR VISIT
[Follow-Up: _____] : a [unfilled] follow-up visit [FreeTextEntry1] : +HLA-B27; hx of raynaud's; sicca symptoms; review labs/Dexa

## 2023-12-13 NOTE — ASSESSMENT
[FreeTextEntry1] : 58-year-old female, here for follow up w/ hx of MS w/ her Neuro; +HLA-B27; multilevel spondylosis; reports of reports of raynaud's like symptoms in the hands >feet & sicca symptoms. Her Sjogren abs and Early Sjogren abs are Negative and she defers lip biopsy that she knows is gold standard for Sjogren. -No synovitis or effusion on exam noted today and advised to monitor. -reviewed labs 12/8/23 w/ ESR normal; CRP normal; CBC ok; 11/3/23 CMP ok; TSH normal; 11/2/22 +HLA-B27; ESR normal CRP normal; Early Sjogren abs negative; CBC ok; CMP ok; vit D normal; 10/7/2021 w/ normal ESR, mildly high CRP=5 ( NL up to 4); +HLA-B27; all serologies WNL incld RO/LA negative -dry mouth: discussed biotene lozenges prescribed; biotene mouthwash or biotene toothpaste or biotene spray PRN; not much cavities and does have saliva pooling in the mouth now -dry eyes: states told to use refresh from her Optho, Dr. Leonard  Raynaud's: no ulcers and reports of color changes to red and purple in the hands>feet w/ the cold. Discussed to keep hands warm and if not controlled can add Ca channel blocker if BP allows -discussed likely primary idiopathic -checked for secondary autoimmune causes w/ labs 10/7/21 all normal incld MAUREEN w/ IF neg; Sm/RNP neg; Scl-70 neg; centromere neg, etc at this time  +HLA-B27: discussed can be seen in normal population -denies any hx of psoriasis or IBD or reactive arthritis symptoms as this time -discussed to alert us if any synovitis of joints; or enthesitis; or dactylitis; or back pain not responsive to NSAIDs and states she understands -has normal ESR/CRP on labs 12/8/23 & will monitor  LBP: intermittent; reports hx of disc herniation on MRI -no SI tenderness today and told if noted can repeat imaging & she defers needing at this time -MRI bony pelvis 12/2/21 without sacroiliitis; multilevel spondylosis -xray SI 11/15/21 w/ subchondral sclerosis of b/l SI, osteophytes -Advil PRN w/ food needed sparingly helps  hx of plantar fasciitis: reports hx of it in Lt heel pain worse in AM and gets better as the day goes on and not much lately -not much pain today -discussed stretching exercises & orthotics -Advil PRN w/ food if needed -if severe injections w/ podiatrist  Bone health: reviewed Dexa 11/17/23 normal  -educated on symptoms to monitor for in detail and alert us if any concerns. -knows to stay up to date on health maintenance w/ PCP -f/u in 6 mo w/ labs please or sooner as needed

## 2023-12-28 ENCOUNTER — APPOINTMENT (OUTPATIENT)
Dept: NEUROLOGY | Facility: CLINIC | Age: 58
End: 2023-12-28
Payer: COMMERCIAL

## 2023-12-28 VITALS
SYSTOLIC BLOOD PRESSURE: 111 MMHG | HEIGHT: 67 IN | DIASTOLIC BLOOD PRESSURE: 75 MMHG | WEIGHT: 185 LBS | HEART RATE: 89 BPM | BODY MASS INDEX: 29.03 KG/M2

## 2023-12-28 PROCEDURE — 99214 OFFICE O/P EST MOD 30 MIN: CPT

## 2023-12-28 RX ORDER — NYSTATIN 100000 U/G
100000 OINTMENT TOPICAL 3 TIMES DAILY
Qty: 1 | Refills: 0 | Status: DISCONTINUED | COMMUNITY
Start: 2022-12-15 | End: 2023-12-28

## 2023-12-28 RX ORDER — CONJUGATED ESTROGENS 0.62 MG/G
0.62 CREAM VAGINAL
Qty: 1 | Refills: 3 | Status: DISCONTINUED | COMMUNITY
Start: 2022-10-03 | End: 2023-12-28

## 2023-12-28 NOTE — DATA REVIEWED
[de-identified] : MRI brain w/w/o 8/16/2023 stable (c/w 2/2023).  MRI brain and C spine 2/14/2023 reviewed- (prior scans not uploaded, but compared w/ report)-  No enhancing lesions. Low disease burden in brain, a single left sided C3 cord lesion.  MRI brain w/w/o 8/31/2022- Stable compared to 3/2022. Reviewed images on patients [facility] portal. Left ovoid parietal lobe lesion noted.  MRI C spine w/w/o 3/2020- Left sided cord lesion at C2-C3. non enhancing.  MRI T w/w/o 3/2020- no cord lesions

## 2023-12-28 NOTE — HISTORY OF PRESENT ILLNESS
[FreeTextEntry1] : INTERIM HX 12/28/2023: s/p ocrevus loading doses (10/18 and 11/2/2023). Felt very tired after infusion, did not feel well for 5 days each time, and has been reporting lower back pain and knee pain since infusion. Hx of chronic lower back pain. No new MS symptoms.   INTERIM HX 08/31/2023: MRI brain w/w/o 8/16/2023 stable (c/w 2/2023). Pt stable. Open to switching DMT at this time.   INTERIM HX 05/12/2023: JCV ab 3'23: 0.30 (indeterminate) MRI brain and C spine 2/14/2023 reviewed- (prior scans not uploaded, but compared w/ report)-  No enhancing lesions. Low disease burden in brain, a single left sided C3 cord lesion. (likely stable) She has been well.  L shoulder pain x 2 months, worse with movements. Seeing Ortho, got cortisone shot helped.  INTERIM HX 01/30/2023: Received Tysabri infusion on 1/18/2023. JCV ab test completed last week, results pending. No new MS symptom. Needs new urologist.   HPI (initial visit Oct 21, 2022)- TRAN OROZCO is a 57 year old right handed woman w/ hx Raynaud with SICCA, HLD, Multilevel spondylosis, referred to neurology for hx of Multiple sclerosis.   In Feb 2015, she woke up one morning and could not feel the water on the left shoulder, numbness spread down her left arm. She was diagnosed with MS in April 2015 by neurologist in [place]. She was treated with IVMP. She had MRI, LP and EMG and ultimately diagnosed with MS. She has been followed by Dr. Panchal at [facility] and is currently on Tysabri every 6 weeks, she has been Tysabri since 2015.   She has been getting brain MRI w/w/o contrast every 6 months. JCV ab level every month. She reports her MRI imaging have all been stable. No relapses on Tysabri. JCV antibody highest was 0.48 in 6/2022, most recent level on 9/14/2022 was indeterminate at 0.38.   Day to day symptoms:- reduced sensation over left hand, 50% improved from initial relapse.   She has 6 children, oldest daughter lives in [place]. She recently became a grandma.  She has neurogenic bladder with incomplete emptying, on tamsulosin, symptoms helping.

## 2023-12-28 NOTE — ASSESSMENT
[FreeTextEntry1] : Assessment/Plan:  58 year old female w/ hx of relapsing multiple sclerosis, dx'd 2015, previously on Tysabri (since 2015) and due to risk of PML (low positive JCV ab and > 8 years of treatment) the decision was made to switch to Ocrevus. She has been on Ocrevus since 10/2023.   Clinically and radiologically stable.   She has been reporting lower back pain and knee pain since her Ocrevus infusion. Unsure if this is medication related vs 2/2 degenerative changes.   Plan: 1. Diagnostic Plan/Imaging: Repeat MRI brain w/w/o contrast in Feb 2024 (6 month follow up scan, + JCV ab). Will also order MRI C spine for radiological stability in 2/2024. Once on Ocrevus, will no longer perform q6 monthly brain MRI's and will plan for annual.  2. Disease Modifying therapy plan: Continue Ocrelizumab 600 mg q6 monthly infusion Ocrelizumab labs every 6 months (CBC with diff, LFT, BUN, Creatinine) - Continue to monitor on Ocrevus, could consider switching to kesimpta or deescalating treatment if she cannot tolerate infusions or experiencing s/e -  3. Symptomatic therapy plan: () Fatigue: Will continue to monitor. Discussed no pharmacological measures to manage fatigue. () Spasticity: Occasional, will continue to monitor. () Neuropathic pain: On gabapentin 600 mg qhs. () Bladder Dysfunction: She has neurogenic bladder with incomplete emptying, on tamsulosin, symptoms helping. Continue to follow with urologist. () Vitamin D3 and B12 supplementations   Return to clinic 3 -4 months  Mercedes Ward M.D.

## 2024-01-04 RX ORDER — CLOTRIMAZOLE AND BETAMETHASONE DIPROPIONATE 10; .5 MG/G; MG/G
1-0.05 CREAM TOPICAL TWICE DAILY
Qty: 1 | Refills: 3 | Status: ACTIVE | COMMUNITY
Start: 2023-10-06 | End: 1900-01-01

## 2024-01-18 ENCOUNTER — NON-APPOINTMENT (OUTPATIENT)
Age: 59
End: 2024-01-18

## 2024-01-18 ENCOUNTER — APPOINTMENT (OUTPATIENT)
Dept: DERMATOLOGY | Facility: CLINIC | Age: 59
End: 2024-01-18
Payer: SELF-PAY

## 2024-01-18 DIAGNOSIS — L65.0 TELOGEN EFFLUVIUM: ICD-10-CM

## 2024-01-18 DIAGNOSIS — L91.8 OTHER HYPERTROPHIC DISORDERS OF THE SKIN: ICD-10-CM

## 2024-01-18 DIAGNOSIS — Z12.83 ENCOUNTER FOR SCREENING FOR MALIGNANT NEOPLASM OF SKIN: ICD-10-CM

## 2024-01-18 DIAGNOSIS — L81.4 OTHER MELANIN HYPERPIGMENTATION: ICD-10-CM

## 2024-01-18 PROCEDURE — 99203 OFFICE O/P NEW LOW 30 MIN: CPT

## 2024-01-18 PROCEDURE — D0125: CPT

## 2024-01-18 NOTE — HISTORY OF PRESENT ILLNESS
[FreeTextEntry1] : hairloss; skin tags; skin check [de-identified] : Ms. TRAN OROZCO is a 58 year old F with MS here for evaluation of below   #Hairloss Switched from Tysabri to Ocrevus on 11/4/23, hairloss started 2 weeks after (pt thinks). +shedding.  Pt denies preceding illness prior to hairloss.  Normal vit D level in Sept 2023; normal TSH and CBC Nov 2023.   #Skin tags on R axilla, neck, under R abdomen. Would like COS removal #Brown spots on face, would like COS removal #FBSE  Personal hx of skin cancer: no Social Hx: not working Referred by: Dr. Jaime

## 2024-01-18 NOTE — PHYSICAL EXAM
[Alert] : alert [Oriented x 3] : ~L oriented x 3 [Well Nourished] : well nourished [Conjunctiva Non-injected] : conjunctiva non-injected [No Visual Lymphadenopathy] : no visual  lymphadenopathy [No Clubbing] : no clubbing [No Edema] : no edema [No Bromhidrosis] : no bromhidrosis [No Chromhidrosis] : no chromhidrosis [Full Body Skin Exam Performed] : performed [FreeTextEntry3] : General: Alert and oriented, in NAD.  All of the following were examined and were within normal limits, except as noted:   Scalp: Face, including eyelids, nose, lips, ears, oropharynx: Neck: Chest/Back/Abdomen: Bilateral Arms/Hands: Bilateral Legs/Feet: Buttocks, Genitalia, Anus/perineum:  	 Hair, Nails, Oral Mucosa, Eyes:   - weakly positive hairpull test; no scale or erythema on scalp - skin tags R axilla x3, posterior neck x1, R abdomen x1 - stuckon waxy tan papules on face and body - lentigines on body - brown homogenous macules on body

## 2024-01-18 NOTE — ASSESSMENT
[FreeTextEntry1] : #Telogen Effluvium, possibly in setting of new MS medication though difficult to confirm causal relationship Reviewed TSH and vit D levels normal in 2023.  Appears to be resolving with new hair growth - Diagnosis and treatment options discussed - Will spontaneously resolve with time  - Trial 2nd infusion of MS medication in May 2024, if hairloss recurs then very likely medication-related  #Acrochordon/Skin tags - Benign diagnosis - Given irritation for 5 lesions, treated with iris scissors snip excision. Aluminum chloride used for hemostasis. Pt tolerated it well. WCI given The risks/benefits/alternatives of were explained to the patient, which include and are not limited to bleeding, infection, scarring or discoloration of skin, and recurrence of lesion. The patient expressed understanding of these risks and provided consent to the procedure. Time out with verification of patient and lesion site was performed. The site was prepped with rubbing alcohol. Lesions were snipped at the base. Procedure was without complication and well tolerated. Wound care was discussed. COS FEE $DYP  #Seborrheic Keratosis - These growths are benign - Related to genetics - these lesions run in families; NOT related to sun exposure - No treatment warranted unless inflamed; can use OTC Sarna lotion PRN itch - Pt interested in COS removal on face; fee discussed. Plan for Feb 2024  #Solar lentigines - Discussed etiology and benign nature of condition - Sun protective measures reinforced. Recommended OTC sunscreen products, including SPF30+ with broadband UV protection as well as proper use.  #Screening exam for skin cancer - no suspicious lesions on exam today - TBSE performed today - Advised sun protection.  Recommended OTC sunscreen products (EltaMD/Neutrogena/La Roche Posay), including SPF30+ with broadband UV protection as well as proper use.  Discussed OTC sun protective clothing - Counseled patient to monitor for changes, including mole monitoring and self-skin exams

## 2024-02-22 ENCOUNTER — APPOINTMENT (OUTPATIENT)
Dept: DERMATOLOGY | Facility: CLINIC | Age: 59
End: 2024-02-22
Payer: SELF-PAY

## 2024-02-22 DIAGNOSIS — L82.1 OTHER SEBORRHEIC KERATOSIS: ICD-10-CM

## 2024-02-22 PROCEDURE — D0052: CPT

## 2024-02-23 PROBLEM — L82.1 SEBORRHEIC KERATOSES: Status: ACTIVE | Noted: 2024-01-18

## 2024-02-23 NOTE — ASSESSMENT
[FreeTextEntry1] : Seborrheic Keratosis - These growths are benign, no need for tx - Pt interested in cosmetic removal. Destruction of 10 lesions on face, as below.  Cosmetic fee TPP  The patient was informed of the pathophysiology of their lesions and their treatment course with liquid nitrogen (cryosurgery). Side effects include blister formation, hypopigmentation, and scarring, as well as permanent nail dystrophy if applicable. Patient was verbally consented and the lesions identified above were treated with liquid nitrogen freeze, thaw, freeze each cycle x 2. The patient tolerated the procedure well.  Wound care instructions, care of a blister with vaseline, signs and symptoms of infection were discussed in full.  The patient denies any questions at this time.

## 2024-02-28 ENCOUNTER — APPOINTMENT (OUTPATIENT)
Dept: MRI IMAGING | Facility: CLINIC | Age: 59
End: 2024-02-28
Payer: COMMERCIAL

## 2024-02-28 ENCOUNTER — OUTPATIENT (OUTPATIENT)
Dept: OUTPATIENT SERVICES | Facility: HOSPITAL | Age: 59
LOS: 1 days | End: 2024-02-28
Payer: COMMERCIAL

## 2024-02-28 DIAGNOSIS — Z90.89 ACQUIRED ABSENCE OF OTHER ORGANS: Chronic | ICD-10-CM

## 2024-02-28 DIAGNOSIS — G35 MULTIPLE SCLEROSIS: ICD-10-CM

## 2024-02-28 DIAGNOSIS — Z41.9 ENCOUNTER FOR PROCEDURE FOR PURPOSES OTHER THAN REMEDYING HEALTH STATE, UNSPECIFIED: Chronic | ICD-10-CM

## 2024-02-28 DIAGNOSIS — Z98.890 OTHER SPECIFIED POSTPROCEDURAL STATES: Chronic | ICD-10-CM

## 2024-02-28 PROCEDURE — 70553 MRI BRAIN STEM W/O & W/DYE: CPT

## 2024-02-28 PROCEDURE — 72156 MRI NECK SPINE W/O & W/DYE: CPT | Mod: 26

## 2024-02-28 PROCEDURE — 72156 MRI NECK SPINE W/O & W/DYE: CPT

## 2024-02-28 PROCEDURE — 70553 MRI BRAIN STEM W/O & W/DYE: CPT | Mod: 26

## 2024-03-01 ENCOUNTER — TRANSCRIPTION ENCOUNTER (OUTPATIENT)
Age: 59
End: 2024-03-01

## 2024-03-04 ENCOUNTER — TRANSCRIPTION ENCOUNTER (OUTPATIENT)
Age: 59
End: 2024-03-04

## 2024-03-20 ENCOUNTER — RX RENEWAL (OUTPATIENT)
Age: 59
End: 2024-03-20

## 2024-03-20 RX ORDER — GABAPENTIN 300 MG/1
300 CAPSULE ORAL
Qty: 180 | Refills: 1 | Status: ACTIVE | COMMUNITY
Start: 2019-05-13 | End: 1900-01-01

## 2024-03-25 ENCOUNTER — RX RENEWAL (OUTPATIENT)
Age: 59
End: 2024-03-25

## 2024-03-25 RX ORDER — TAMSULOSIN HYDROCHLORIDE 0.4 MG/1
0.4 CAPSULE ORAL
Qty: 90 | Refills: 0 | Status: ACTIVE | COMMUNITY
Start: 2023-03-23 | End: 1900-01-01

## 2024-03-28 ENCOUNTER — TRANSCRIPTION ENCOUNTER (OUTPATIENT)
Age: 59
End: 2024-03-28

## 2024-03-29 ENCOUNTER — TRANSCRIPTION ENCOUNTER (OUTPATIENT)
Age: 59
End: 2024-03-29

## 2024-03-29 RX ORDER — BACLOFEN 10 MG/1
10 TABLET ORAL
Qty: 60 | Refills: 3 | Status: ACTIVE | COMMUNITY
Start: 2024-03-29 | End: 1900-01-01

## 2024-04-02 LAB
ALBUMIN SERPL ELPH-MCNC: 4.3 G/DL
ALP BLD-CCNC: 60 U/L
ALT SERPL-CCNC: 9 U/L
ANION GAP SERPL CALC-SCNC: 13 MMOL/L
AST SERPL-CCNC: 14 U/L
BASOPHILS # BLD AUTO: 0.05 K/UL
BASOPHILS NFR BLD AUTO: 0.9 %
BILIRUB SERPL-MCNC: 0.4 MG/DL
BUN SERPL-MCNC: 11 MG/DL
CALCIUM SERPL-MCNC: 9.2 MG/DL
CHLORIDE SERPL-SCNC: 106 MMOL/L
CO2 SERPL-SCNC: 24 MMOL/L
CREAT SERPL-MCNC: 0.91 MG/DL
EGFR: 72 ML/MIN/1.73M2
EOSINOPHIL # BLD AUTO: 0.63 K/UL
EOSINOPHIL NFR BLD AUTO: 11.1 %
GLUCOSE SERPL-MCNC: 72 MG/DL
HCT VFR BLD CALC: 43.9 %
HGB BLD-MCNC: 14.7 G/DL
IMM GRANULOCYTES NFR BLD AUTO: 0.2 %
LYMPHOCYTES # BLD AUTO: 1.58 K/UL
LYMPHOCYTES NFR BLD AUTO: 27.7 %
MAN DIFF?: NORMAL
MCHC RBC-ENTMCNC: 29.6 PG
MCHC RBC-ENTMCNC: 33.5 GM/DL
MCV RBC AUTO: 88.5 FL
MONOCYTES # BLD AUTO: 0.46 K/UL
MONOCYTES NFR BLD AUTO: 8.1 %
NEUTROPHILS # BLD AUTO: 2.97 K/UL
NEUTROPHILS NFR BLD AUTO: 52 %
PLATELET # BLD AUTO: 256 K/UL
POTASSIUM SERPL-SCNC: 4.6 MMOL/L
PROT SERPL-MCNC: 6.5 G/DL
RBC # BLD: 4.96 M/UL
RBC # FLD: 13.8 %
SODIUM SERPL-SCNC: 142 MMOL/L
WBC # FLD AUTO: 5.7 K/UL

## 2024-04-04 ENCOUNTER — TRANSCRIPTION ENCOUNTER (OUTPATIENT)
Age: 59
End: 2024-04-04

## 2024-04-15 RX ORDER — ROSUVASTATIN CALCIUM 5 MG/1
5 TABLET, FILM COATED ORAL
Qty: 90 | Refills: 3 | Status: ACTIVE | COMMUNITY
Start: 2021-11-23 | End: 1900-01-01

## 2024-04-29 ENCOUNTER — RX RENEWAL (OUTPATIENT)
Age: 59
End: 2024-04-29

## 2024-05-02 RX ORDER — METHYLPREDNISOLONE 125 MG/2ML
125 INJECTION, POWDER, LYOPHILIZED, FOR SOLUTION INTRAMUSCULAR; INTRAVENOUS
Refills: 0 | Status: ACTIVE | OUTPATIENT
Start: 2024-05-02

## 2024-05-02 RX ORDER — FAMOTIDINE 20 MG/1
20 TABLET, FILM COATED ORAL
Refills: 0 | Status: ACTIVE | OUTPATIENT
Start: 2024-05-02

## 2024-05-14 ENCOUNTER — APPOINTMENT (OUTPATIENT)
Dept: NEUROLOGY | Facility: CLINIC | Age: 59
End: 2024-05-14
Payer: COMMERCIAL

## 2024-05-14 VITALS — HEART RATE: 69 BPM | RESPIRATION RATE: 18 BRPM | DIASTOLIC BLOOD PRESSURE: 67 MMHG | SYSTOLIC BLOOD PRESSURE: 109 MMHG

## 2024-05-14 VITALS — RESPIRATION RATE: 18 BRPM | SYSTOLIC BLOOD PRESSURE: 111 MMHG | HEART RATE: 73 BPM | DIASTOLIC BLOOD PRESSURE: 69 MMHG

## 2024-05-14 VITALS — RESPIRATION RATE: 18 BRPM | SYSTOLIC BLOOD PRESSURE: 107 MMHG | DIASTOLIC BLOOD PRESSURE: 72 MMHG | HEART RATE: 72 BPM

## 2024-05-14 VITALS — DIASTOLIC BLOOD PRESSURE: 63 MMHG | RESPIRATION RATE: 18 BRPM | HEART RATE: 76 BPM | SYSTOLIC BLOOD PRESSURE: 105 MMHG

## 2024-05-14 VITALS — RESPIRATION RATE: 18 BRPM | HEART RATE: 68 BPM | DIASTOLIC BLOOD PRESSURE: 69 MMHG | SYSTOLIC BLOOD PRESSURE: 118 MMHG

## 2024-05-14 VITALS — DIASTOLIC BLOOD PRESSURE: 67 MMHG | HEART RATE: 71 BPM | SYSTOLIC BLOOD PRESSURE: 104 MMHG | RESPIRATION RATE: 18 BRPM

## 2024-05-14 VITALS — DIASTOLIC BLOOD PRESSURE: 76 MMHG | RESPIRATION RATE: 18 BRPM | SYSTOLIC BLOOD PRESSURE: 123 MMHG | HEART RATE: 83 BPM

## 2024-05-14 VITALS — RESPIRATION RATE: 18 BRPM | HEART RATE: 67 BPM | SYSTOLIC BLOOD PRESSURE: 110 MMHG | DIASTOLIC BLOOD PRESSURE: 65 MMHG

## 2024-05-14 PROCEDURE — 96413 CHEMO IV INFUSION 1 HR: CPT

## 2024-05-14 PROCEDURE — 96415 CHEMO IV INFUSION ADDL HR: CPT

## 2024-05-14 NOTE — HISTORY OF PRESENT ILLNESS
[Denies] : Denies [No] : No [Yes] : Yes [Declined] : Declined [Right upper extremity] : Right upper extremity [22g] : 22g [Medication Name: ___] : Medication Name: [unfilled] [Total Amount Administered: ___] : Total Amount Administered: [unfilled] [Start Time: ___] : Medication Start Time: [unfilled] [End Time: ___] : Medication End Time: [unfilled] [IV discontinued. Intact. No signs or symptoms of IV complications noted. Time: ___] : IV discontinued. Intact. No signs or symptoms of IV complications noted. Time: [unfilled] [Patient  instructed to seek medical attention with signs and symptoms of adverse effects] : Patient  instructed to seek medical attention with signs and symptoms of adverse effects [Patient left unit in no acute distress] : Patient left unit in no acute distress [Medications administered as ordered and tolerated well.] : Medications administered as ordered and tolerated well. [de-identified] : slight nausea the next day. Dr. Ward aware [de-identified] : a/c [de-identified] : 10:23 [de-identified] : Patient observed for 30 minutes post infusion.  Generic: ocrelizumab Dose: 600mg Infusion rate:                       40ml/hr for 30 mins                       80ml/hr for 30 mins                      120ml/hr for 30 mins                       160ml/hr for 30 mins                       200ml/hr for 30 mins NDC#: 29965-308-25 Lot#: [id number] Exp: 01/2026 Was this medication buy and bill? No Specialty Pharmacy: [facility] Dx: Multiple Sclerosis MD Prescriber: Dr. Ward  CPT: 34462, [id number] Pre-medication Acetaminophen: 650mg Route: PO Famotidine 20mg Route: PO Diphenhydramine: 50mg Route: PO NDC#: Lot #: Exp: Methylprednisolone: 125mg Route: IV NDC#: 1644-7002-61 Lot#: [id number] Exp: 2026-07  Next Appointment: MORRO Mcneil tasked.

## 2024-05-21 ENCOUNTER — APPOINTMENT (OUTPATIENT)
Dept: NEUROLOGY | Facility: CLINIC | Age: 59
End: 2024-05-21
Payer: COMMERCIAL

## 2024-05-21 VITALS
DIASTOLIC BLOOD PRESSURE: 72 MMHG | WEIGHT: 186 LBS | SYSTOLIC BLOOD PRESSURE: 105 MMHG | BODY MASS INDEX: 29.19 KG/M2 | HEART RATE: 85 BPM | HEIGHT: 67 IN

## 2024-05-21 DIAGNOSIS — G35 MULTIPLE SCLEROSIS: ICD-10-CM

## 2024-05-21 PROCEDURE — 99214 OFFICE O/P EST MOD 30 MIN: CPT

## 2024-05-21 PROCEDURE — G2211 COMPLEX E/M VISIT ADD ON: CPT

## 2024-05-21 NOTE — DATA REVIEWED
[de-identified] : MR brain and C spine 2/28/2024-stable.  MRI brain w/w/o 8/16/2023 stable (c/w 2/2023).  MRI brain and C spine 2/14/2023 reviewed- (prior scans not uploaded, but compared w/ report)-  No enhancing lesions. Low disease burden in brain, a single left sided C3 cord lesion.  MRI brain w/w/o 8/31/2022- Stable compared to 3/2022. Reviewed images on patients [facility] portal. Left ovoid parietal lobe lesion noted.  MRI C spine w/w/o 3/2020- Left sided cord lesion at C2-C3. non enhancing.  MRI T w/w/o 3/2020- no cord lesions

## 2024-05-21 NOTE — PHYSICAL EXAM
[FreeTextEntry1] :  Strength is full bilaterally. 5/5 muscle power in bilateral UE and LE. Reduced sensation to light touch/PP over LUE  Tinel sign at elbow neg Tinel sign at wrist neg Phalen sign neg

## 2024-05-21 NOTE — HISTORY OF PRESENT ILLNESS
[FreeTextEntry1] : INTERIM HX 05/21/2024: 1st full dose of ocrevus 5/14. No issues with this infusion.  MR brain and C spine 2/28/2024-stable. Stable from MS standpoint. LUE still numb, L pinky and ring finger get a sharp sensation when under cold water.  Seeing ortho for L shoulder adhesive capsulitis.   INTERIM HX 12/28/2023: s/p ocrevus loading doses (10/18 and 11/2/2023). Felt very tired after infusion, did not feel well for 5 days each time, and has been reporting lower back pain and knee pain since infusion. Hx of chronic lower back pain. No new MS symptoms.   INTERIM HX 08/31/2023: MRI brain w/w/o 8/16/2023 stable (c/w 2/2023). Pt stable. Open to switching DMT at this time.   INTERIM HX 05/12/2023: JCV ab 3'23: 0.30 (indeterminate) MRI brain and C spine 2/14/2023 reviewed- (prior scans not uploaded, but compared w/ report)-  No enhancing lesions. Low disease burden in brain, a single left sided C3 cord lesion. (likely stable) She has been well.  L shoulder pain x 2 months, worse with movements. Seeing Ortho, got cortisone shot helped.  INTERIM HX 01/30/2023: Received Tysabri infusion on 1/18/2023. JCV ab test completed last week, results pending. No new MS symptom. Needs new urologist.   HPI (initial visit Oct 21, 2022)- TRAN OROZCO is a 57 year old right handed woman w/ hx Raynaud with SICCA, HLD, Multilevel spondylosis, referred to neurology for hx of Multiple sclerosis.   In Feb 2015, she woke up one morning and could not feel the water on the left shoulder, numbness spread down her left arm. She was diagnosed with MS in April 2015 by neurologist in [place]. She was treated with IVMP. She had MRI, LP and EMG and ultimately diagnosed with MS. She has been followed by Dr. Panchal at [facility] and is currently on Tysabri every 6 weeks, she has been Tysabri since 2015.   She has been getting brain MRI w/w/o contrast every 6 months. JCV ab level every month. She reports her MRI imaging have all been stable. No relapses on Tysabri. JCV antibody highest was 0.48 in 6/2022, most recent level on 9/14/2022 was indeterminate at 0.38.   Day to day symptoms:- reduced sensation over left hand, 50% improved from initial relapse.   She has 6 children, oldest daughter lives in [place]. She recently became a grandma.  She has neurogenic bladder with incomplete emptying, on tamsulosin, symptoms helping.

## 2024-05-21 NOTE — ASSESSMENT
[FreeTextEntry1] : Assessment/Plan:  58 year old female w/ hx of relapsing multiple sclerosis, dx'd 2015, previously on Tysabri (since 2015) and due to risk of PML (low positive JCV ab and > 8 years of treatment) the decision was made to switch to Ocrevus. She has been on Ocrevus since 10/2023.  Clinically and radiologically stable.  Plan: 1. Diagnostic Plan/Imaging: Repeat MRI brain and cervical spine w/w/o contrast in Feb/March 2025.  2. Disease Modifying therapy plan: Continue Ocrelizumab 600 mg q6 monthly infusion Ocrelizumab labs every 6 months (CBC with diff, LFT, BUN, Creatinine)  3. Symptomatic therapy plan: () Fatigue: Will continue to monitor. Discussed no pharmacological measures to manage fatigue. () Spasticity: Baclofen PRN for "MS hug" () Neuropathic pain: On gabapentin 600 mg qhs. () Bladder Dysfunction: She has neurogenic bladder with incomplete emptying, on tamsulosin, symptoms helping. Continue to follow with urologist. () Vitamin D3 and B12 supplementations  4. L shoulder adhesive capsulitis- Follow up with Ortho  Return to clinic 4 months  Mercedes Ward M.D.

## 2024-06-03 ENCOUNTER — TRANSCRIPTION ENCOUNTER (OUTPATIENT)
Age: 59
End: 2024-06-03

## 2024-06-10 ENCOUNTER — APPOINTMENT (OUTPATIENT)
Dept: RHEUMATOLOGY | Facility: CLINIC | Age: 59
End: 2024-06-10
Payer: COMMERCIAL

## 2024-06-10 VITALS
HEIGHT: 67 IN | TEMPERATURE: 97.1 F | OXYGEN SATURATION: 98 % | BODY MASS INDEX: 29.51 KG/M2 | DIASTOLIC BLOOD PRESSURE: 68 MMHG | HEART RATE: 69 BPM | SYSTOLIC BLOOD PRESSURE: 104 MMHG | WEIGHT: 188 LBS

## 2024-06-10 DIAGNOSIS — M47.819 SPONDYLOSIS W/OUT MYELOPATHY OR RADICULOPATHY, SITE UNSPECIFIED: ICD-10-CM

## 2024-06-10 DIAGNOSIS — H04.123 DRY EYE SYNDROME OF BILATERAL LACRIMAL GLANDS: ICD-10-CM

## 2024-06-10 DIAGNOSIS — Z87.898 PERSONAL HISTORY OF OTHER SPECIFIED CONDITIONS: ICD-10-CM

## 2024-06-10 DIAGNOSIS — M72.2 PLANTAR FASCIAL FIBROMATOSIS: ICD-10-CM

## 2024-06-10 DIAGNOSIS — I73.00 RAYNAUD'S SYNDROME W/OUT GANGRENE: ICD-10-CM

## 2024-06-10 DIAGNOSIS — Z15.89 GENETIC SUSCEPTIBILITY TO OTHER DISEASE: ICD-10-CM

## 2024-06-10 PROCEDURE — 99214 OFFICE O/P EST MOD 30 MIN: CPT

## 2024-06-10 PROCEDURE — G2211 COMPLEX E/M VISIT ADD ON: CPT

## 2024-06-10 NOTE — ASSESSMENT
[FreeTextEntry1] : 58-year-old female, here for follow up w/ hx of MS w/ her Neuro; +HLA-B27; multilevel spondylosis; reports of reports of raynaud's like symptoms in the hands >feet & sicca symptoms. Her Sjogren abs and Early Sjogren abs are Negative and she defers lip biopsy that she knows is gold standard for Sjogren. -No synovitis or effusion on exam noted today and advised to monitor. -reviewed labs 4/2/24 w/ CBC ok; CMP ok; 12/8/23 w/ ESR normal; CRP normal; CBC ok; 11/3/23 CMP ok; TSH normal; 11/2/22 +HLA-B27; ESR normal CRP normal; Early Sjogren abs negative; CBC ok; CMP ok; vit D normal; 10/7/2021 w/ normal ESR, mildly high CRP=5 (NL up to 4); +HLA-B27; all serologies WNL incld RO/LA negative -dry mouth: discussed biotene lozenges prescribed; biotene mouthwash or biotene toothpaste or biotene spray PRN; not much cavities and does have saliva pooling in the mouth now -dry eyes: states told to use refresh from her Optho, Dr. Leonard  Raynaud's: no ulcers and reports of color changes to red and purple in the hands>feet w/ the cold. Discussed to keep hands warm and if not controlled can add Ca channel blocker if BP allows -discussed likely primary idiopathic -checked for secondary autoimmune causes w/ labs 10/7/21 all normal incld MAUREEN w/ IF neg; Sm/RNP neg; Scl-70 neg; centromere neg, etc at this time  +HLA-B27: discussed can be seen in normal population -denies any hx of psoriasis or IBD or reactive arthritis symptoms as this time -discussed to alert us if any synovitis of joints; or enthesitis; or dactylitis; or back pain not responsive to NSAIDs and states she understands -has normal ESR/CRP on labs 12/8/23 & will monitor   LBP: intermittent; reports hx of disc herniation on MRI -no SI tenderness today and told if noted can repeat imaging & she defers needing at this time -MRI bony pelvis 12/2/21 without sacroiliitis; multilevel spondylosis -xray SI 11/15/21 w/ subchondral sclerosis of b/l SI, osteophytes -Advil PRN w/ food needed sparingly helps  hx of plantar fasciitis: reports hx of it in Lt heel pain worse in AM and gets better as the day goes on and not much lately -not much pain today -discussed stretching exercises & orthotics -Advil PRN w/ food if needed -if severe injections w/ podiatrist  Bone health: reviewed Dexa 11/17/23 normal  -educated on symptoms to monitor for in detail and alert us if any concerns. -knows to stay up to date on health maintenance w/ PCP -f/u in 6 mo w/ labs please or sooner as needed.

## 2024-06-10 NOTE — HISTORY OF PRESENT ILLNESS
[FreeTextEntry1] : 58-year-old female, here for follow w/ hx of MS w/ her Neuro; +HLA-B27; multilevel spondylosis; reports of reports of raynaud's like symptoms in the hands >feet & sicca symptoms. Her Sjogren abs and Early Sjogren abs are Negative and she defers lip biopsy that she knows is gold standard for Sjogren. She reports of dry mouth since around May of 2021. Patient states she does not have a lot of cavities. States the dry mouth is intermittent. Patient states she notices dry eyes and told by her Optho, Dr. Leonard to use refresh. Denies any swelling or redness or warmth of any joints. States she had some lower back pain worse with lifting; better with resting; not much pain today. Denies any loss of bladder or bowel incontinence or saddle anesthesia. States she has history of a disc herniation on MRI in the past. States she has hx of Lt heel pain worse in AM and gets better as the day goes on and not much lately. Denies any Achillies pain and no swelling. States she can notice that her hands more than feet can turn white or blue in the cold and controlled w/ keeping them warm without any ulcers. Denies any fever/chills, hx of oral ulcers in the past and not now; no nasal or genital ulcers; no rashes and no skin thickening today, no infectious diarrhea or  symptoms at this time.  Her Dexa 11/17/23 is normal.

## 2024-06-10 NOTE — REASON FOR VISIT
[Follow-Up: _____] : a [unfilled] follow-up visit [FreeTextEntry1] : +HLA-B27; hx of raynaud's; sicca symptoms; labs/symptoms

## 2024-06-10 NOTE — PHYSICAL EXAM
[General Appearance - Alert] : alert [General Appearance - In No Acute Distress] : in no acute distress [Sclera] : the sclera and conjunctiva were normal [Extraocular Movements] : extraocular movements were intact [Outer Ear] : the ears and nose were normal in appearance [Neck Appearance] : the appearance of the neck was normal [Respiration, Rhythm And Depth] : normal respiratory rhythm and effort [Heart Rate And Rhythm] : heart rate was normal and rhythm regular [Heart Sounds] : normal S1 and S2 [Abdomen Soft] : soft [Abdomen Tenderness] : non-tender [Cervical Lymph Nodes Enlarged Anterior Bilaterally] : anterior cervical [Supraclavicular Lymph Nodes Enlarged Bilaterally] : supraclavicular [No CVA Tenderness] : no ~M costovertebral angle tenderness [Motor Tone] : muscle strength and tone were normal [] : no rash [No Focal Deficits] : no focal deficits [Impaired Insight] : insight and judgment were intact [Mood] : the mood was normal [FreeTextEntry1] : No synovitis or effusion on exam noted today; Good ROM in Rt shoulder w/o tenderness, hx of some limited ROM in Lt shoulder (monitoring w/ her Ortho); no pelvic/girdle stiffness and able to stand up without using her hands

## 2024-07-26 ENCOUNTER — RX RENEWAL (OUTPATIENT)
Age: 59
End: 2024-07-26

## 2024-07-29 ENCOUNTER — TRANSCRIPTION ENCOUNTER (OUTPATIENT)
Age: 59
End: 2024-07-29

## 2024-07-29 DIAGNOSIS — U07.1 COVID-19: ICD-10-CM

## 2024-07-29 RX ORDER — NIRMATRELVIR AND RITONAVIR 300-100 MG
20 X 150 MG & KIT ORAL
Qty: 30 | Refills: 0 | Status: ACTIVE | COMMUNITY
Start: 2024-07-29 | End: 1900-01-01

## 2024-08-07 ENCOUNTER — RX RENEWAL (OUTPATIENT)
Age: 59
End: 2024-08-07

## 2024-09-12 ENCOUNTER — NON-APPOINTMENT (OUTPATIENT)
Age: 59
End: 2024-09-12

## 2024-09-30 ENCOUNTER — RX RENEWAL (OUTPATIENT)
Age: 59
End: 2024-09-30

## 2024-09-30 ENCOUNTER — APPOINTMENT (OUTPATIENT)
Dept: NEUROLOGY | Facility: CLINIC | Age: 59
End: 2024-09-30
Payer: COMMERCIAL

## 2024-09-30 DIAGNOSIS — G35 MULTIPLE SCLEROSIS: ICD-10-CM

## 2024-09-30 PROCEDURE — 99214 OFFICE O/P EST MOD 30 MIN: CPT

## 2024-09-30 PROCEDURE — G2211 COMPLEX E/M VISIT ADD ON: CPT

## 2024-09-30 RX ORDER — BACLOFEN 5 MG/1
5 TABLET ORAL
Qty: 120 | Refills: 6 | Status: ACTIVE | COMMUNITY
Start: 2024-09-30 | End: 1900-01-01

## 2024-09-30 RX ORDER — ESTRADIOL AND NORETHINDRONE ACETATE 1; .5 MG/1; MG/1
1-0.5 TABLET, FILM COATED ORAL DAILY
Qty: 1 | Refills: 0 | Status: ACTIVE | COMMUNITY
Start: 2024-09-30 | End: 1900-01-01

## 2024-09-30 NOTE — DATA REVIEWED
[de-identified] : MR brain and C spine 2/28/2024-stable.  MRI brain w/w/o 8/16/2023 stable (c/w 2/2023).  MRI brain and C spine 2/14/2023 reviewed- (prior scans not uploaded, but compared w/ report)-  No enhancing lesions. Low disease burden in brain, a single left sided C3 cord lesion.  MRI brain w/w/o 8/31/2022- Stable compared to 3/2022. Reviewed images on patients [facility] portal. Left ovoid parietal lobe lesion noted.  MRI C spine w/w/o 3/2020- Left sided cord lesion at C2-C3. non enhancing.  MRI T w/w/o 3/2020- no cord lesions

## 2024-09-30 NOTE — HISTORY OF PRESENT ILLNESS
[FreeTextEntry1] : [This is a telehealth 2-way video visit] Verbal consent given on Sep 30, 2024  at  1:00PM by TRAN OROZCO Patient location: [address] [place] [address] Provider location: Medical office ([facility])  INTERIM HX 09/30/2024: [This is a telehealth 2-way video visit] She got COVID last week, had mild cold symptoms and headaches.  Hair stopped falling out. MS hugs better. No new MS symptoms.    INTERIM HX 05/21/2024: 1st full dose of ocrevus 5/14. No issues with this infusion.  MR brain and C spine 2/28/2024-stable. Stable from MS standpoint. LUE still numb, L pinky and ring finger get a sharp sensation when under cold water.  Seeing ortho for L shoulder adhesive capsulitis.   INTERIM HX 12/28/2023: s/p ocrevus loading doses (10/18 and 11/2/2023). Felt very tired after infusion, did not feel well for 5 days each time, and has been reporting lower back pain and knee pain since infusion. Hx of chronic lower back pain. No new MS symptoms.   INTERIM HX 08/31/2023: MRI brain w/w/o 8/16/2023 stable (c/w 2/2023). Pt stable. Open to switching DMT at this time.   INTERIM HX 05/12/2023: JCV ab 3'23: 0.30 (indeterminate) MRI brain and C spine 2/14/2023 reviewed- (prior scans not uploaded, but compared w/ report)-  No enhancing lesions. Low disease burden in brain, a single left sided C3 cord lesion. (likely stable) She has been well.  L shoulder pain x 2 months, worse with movements. Seeing Ortho, got cortisone shot helped.  INTERIM HX 01/30/2023: Received Tysabri infusion on 1/18/2023. JCV ab test completed last week, results pending. No new MS symptom. Needs new urologist.   HPI (initial visit Oct 21, 2022)- TRAN OROZCO is a 57 year old right handed woman w/ hx Raynaud with SICCA, HLD, Multilevel spondylosis, referred to neurology for hx of Multiple sclerosis.   In Feb 2015, she woke up one morning and could not feel the water on the left shoulder, numbness spread down her left arm. She was diagnosed with MS in April 2015 by neurologist in [place]. She was treated with IVMP. She had MRI, LP and EMG and ultimately diagnosed with MS. She has been followed by Dr. Panchal at [facility] and is currently on Tysabri every 6 weeks, she has been Tysabri since 2015.   She has been getting brain MRI w/w/o contrast every 6 months. JCV ab level every month. She reports her MRI imaging have all been stable. No relapses on Tysabri. JCV antibody highest was 0.48 in 6/2022, most recent level on 9/14/2022 was indeterminate at 0.38.   Day to day symptoms:- reduced sensation over left hand, 50% improved from initial relapse.   She has 6 children, oldest daughter lives in [place]. She recently became a grandma.  She has neurogenic bladder with incomplete emptying, on tamsulosin, symptoms helping.

## 2024-09-30 NOTE — ASSESSMENT
[FreeTextEntry1] : Assessment/Plan:  59 year old female w/ hx of relapsing multiple sclerosis, dx'd 2015, previously on Tysabri (since 2015) and due to risk of PML (low positive JCV ab and > 8 years of treatment) the decision was made to switch to Ocrevus. She has been on Ocrevus since 10/2023.  Clinically and radiologically stable.  Plan: 1. Diagnostic Plan/Imaging: Repeat MRI brain and cervical spine w/w/o contrast in Feb/March 2025 - order at next visit.  2. Disease Modifying therapy plan: Continue Ocrelizumab 600 mg q6 monthly infusion Ocrelizumab labs every 6 months - ordered  3. Symptomatic therapy plan: () Fatigue: Will continue to monitor. Discussed nonpharmacological measures to manage fatigue. () Spasticity: Baclofen PRN for "MS hug" () Neuropathic pain: On gabapentin 600 mg qhs. () Bladder Dysfunction: She has neurogenic bladder with incomplete emptying, on tamsulosin, symptoms helping. Continue to follow with urologist. () Vitamin D3 and B12 supplementations  4. L shoulder adhesive capsulitis- Follow up with Ortho  Return to clinic 6 months  Mercedes Ward M.D.

## 2024-10-07 ENCOUNTER — APPOINTMENT (OUTPATIENT)
Dept: OBGYN | Facility: CLINIC | Age: 59
End: 2024-10-07
Payer: COMMERCIAL

## 2024-10-07 VITALS
DIASTOLIC BLOOD PRESSURE: 72 MMHG | HEIGHT: 67 IN | SYSTOLIC BLOOD PRESSURE: 118 MMHG | BODY MASS INDEX: 29.03 KG/M2 | WEIGHT: 185 LBS

## 2024-10-07 DIAGNOSIS — Z01.419 ENCOUNTER FOR GYNECOLOGICAL EXAMINATION (GENERAL) (ROUTINE) W/OUT ABNORMAL FINDINGS: ICD-10-CM

## 2024-10-07 LAB
CARD LOT #: NORMAL
CARD LOT EXP DATE: NORMAL
DATE COLLECTED: NORMAL
DATE COLLECTED: NORMAL
DEVELOPER LOT #: NORMAL
DEVELOPER LOT EXP DATE: NORMAL
HEMOCCULT 2: NEGATIVE
HEMOCCULT SP1 STL QL: NEGATIVE
QUALITY CONTROL: YES
QUALITY CONTROL: YES

## 2024-10-07 PROCEDURE — 82270 OCCULT BLOOD FECES: CPT

## 2024-10-07 PROCEDURE — 99396 PREV VISIT EST AGE 40-64: CPT

## 2024-10-07 RX ORDER — ESTRADIOL 0.1 MG/G
0.1 CREAM VAGINAL
Qty: 1 | Refills: 3 | Status: ACTIVE | COMMUNITY
Start: 2024-10-07 | End: 1900-01-01

## 2024-10-12 LAB
ALBUMIN SERPL ELPH-MCNC: 4.2 G/DL
ALP BLD-CCNC: 61 U/L
ALT SERPL-CCNC: 6 U/L
ANION GAP SERPL CALC-SCNC: 14 MMOL/L
AST SERPL-CCNC: 10 U/L
BASOPHILS # BLD AUTO: 0.07 K/UL
BASOPHILS NFR BLD AUTO: 1 %
BILIRUB SERPL-MCNC: 0.5 MG/DL
BUN SERPL-MCNC: 17 MG/DL
CALCIUM SERPL-MCNC: 9.8 MG/DL
CHLORIDE SERPL-SCNC: 105 MMOL/L
CO2 SERPL-SCNC: 22 MMOL/L
CREAT SERPL-MCNC: 0.92 MG/DL
DEPRECATED KAPPA LC FREE/LAMBDA SER: 1.44 RATIO
EGFR: 71 ML/MIN/1.73M2
EOSINOPHIL # BLD AUTO: 0.39 K/UL
EOSINOPHIL NFR BLD AUTO: 5.8 %
GLUCOSE SERPL-MCNC: 80 MG/DL
HCT VFR BLD CALC: 42.6 %
HGB BLD-MCNC: 14 G/DL
IGA SER QL IEP: 99 MG/DL
IGG SER QL IEP: 649 MG/DL
IGM SER QL IEP: 34 MG/DL
IMM GRANULOCYTES NFR BLD AUTO: 0.3 %
KAPPA LC CSF-MCNC: 0.78 MG/DL
KAPPA LC SERPL-MCNC: 1.12 MG/DL
LYMPHOCYTES # BLD AUTO: 1.89 K/UL
LYMPHOCYTES NFR BLD AUTO: 28.3 %
MAN DIFF?: NORMAL
MCHC RBC-ENTMCNC: 29.4 PG
MCHC RBC-ENTMCNC: 32.9 GM/DL
MCV RBC AUTO: 89.3 FL
MONOCYTES # BLD AUTO: 0.51 K/UL
MONOCYTES NFR BLD AUTO: 7.6 %
NEUTROPHILS # BLD AUTO: 3.81 K/UL
NEUTROPHILS NFR BLD AUTO: 57 %
PLATELET # BLD AUTO: 265 K/UL
POTASSIUM SERPL-SCNC: 4.8 MMOL/L
PROT SERPL-MCNC: 6 G/DL
RBC # BLD: 4.77 M/UL
RBC # FLD: 13.3 %
SODIUM SERPL-SCNC: 141 MMOL/L
WBC # FLD AUTO: 6.69 K/UL

## 2024-10-14 LAB — HPV HIGH+LOW RISK DNA PNL CVX: NOT DETECTED

## 2024-10-15 ENCOUNTER — TRANSCRIPTION ENCOUNTER (OUTPATIENT)
Age: 59
End: 2024-10-15

## 2024-10-17 ENCOUNTER — TRANSCRIPTION ENCOUNTER (OUTPATIENT)
Age: 59
End: 2024-10-17

## 2024-10-18 ENCOUNTER — RX RENEWAL (OUTPATIENT)
Age: 59
End: 2024-10-18

## 2024-10-22 ENCOUNTER — APPOINTMENT (OUTPATIENT)
Dept: OPHTHALMOLOGY | Facility: CLINIC | Age: 59
End: 2024-10-22
Payer: COMMERCIAL

## 2024-10-22 ENCOUNTER — NON-APPOINTMENT (OUTPATIENT)
Age: 59
End: 2024-10-22

## 2024-10-22 PROCEDURE — 92004 COMPRE OPH EXAM NEW PT 1/>: CPT

## 2024-10-23 ENCOUNTER — APPOINTMENT (OUTPATIENT)
Dept: ORTHOPEDIC SURGERY | Facility: CLINIC | Age: 59
End: 2024-10-23

## 2024-10-23 VITALS
SYSTOLIC BLOOD PRESSURE: 105 MMHG | HEART RATE: 86 BPM | DIASTOLIC BLOOD PRESSURE: 70 MMHG | TEMPERATURE: 98 F | HEIGHT: 67 IN | WEIGHT: 185 LBS | BODY MASS INDEX: 29.03 KG/M2

## 2024-10-23 DIAGNOSIS — M75.42 IMPINGEMENT SYNDROME OF LEFT SHOULDER: ICD-10-CM

## 2024-10-23 PROCEDURE — 20610 DRAIN/INJ JOINT/BURSA W/O US: CPT | Mod: LT

## 2024-10-23 PROCEDURE — 73030 X-RAY EXAM OF SHOULDER: CPT | Mod: LT

## 2024-10-23 PROCEDURE — 99212 OFFICE O/P EST SF 10 MIN: CPT | Mod: 25

## 2024-10-26 ENCOUNTER — RX RENEWAL (OUTPATIENT)
Age: 59
End: 2024-10-26

## 2024-10-29 VITALS — HEIGHT: 67 IN | BODY MASS INDEX: 29.03 KG/M2 | WEIGHT: 185 LBS

## 2024-10-31 ENCOUNTER — APPOINTMENT (OUTPATIENT)
Dept: UROLOGY | Facility: CLINIC | Age: 59
End: 2024-10-31
Payer: COMMERCIAL

## 2024-10-31 DIAGNOSIS — N31.9 NEUROMUSCULAR DYSFUNCTION OF BLADDER, UNSPECIFIED: ICD-10-CM

## 2024-10-31 DIAGNOSIS — N95.2 POSTMENOPAUSAL ATROPHIC VAGINITIS: ICD-10-CM

## 2024-10-31 PROCEDURE — 99214 OFFICE O/P EST MOD 30 MIN: CPT

## 2024-11-01 ENCOUNTER — TRANSCRIPTION ENCOUNTER (OUTPATIENT)
Age: 59
End: 2024-11-01

## 2024-11-01 LAB
APPEARANCE: ABNORMAL
BACTERIA: ABNORMAL /HPF
BILIRUBIN URINE: NEGATIVE
BLOOD URINE: NEGATIVE
CAST: 2 /LPF
COLOR: NORMAL
EPITHELIAL CELLS: 11 /HPF
GLUCOSE QUALITATIVE U: NEGATIVE MG/DL
KETONES URINE: ABNORMAL MG/DL
LEUKOCYTE ESTERASE URINE: ABNORMAL
MICROSCOPIC-UA: NORMAL
NITRITE URINE: NEGATIVE
PH URINE: 5.5
PROTEIN URINE: NORMAL MG/DL
RED BLOOD CELLS URINE: 1 /HPF
SPECIFIC GRAVITY URINE: >1.03
UROBILINOGEN URINE: 1 MG/DL
WHITE BLOOD CELLS URINE: 54 /HPF

## 2024-11-04 LAB — BACTERIA UR CULT: NORMAL

## 2024-11-13 ENCOUNTER — APPOINTMENT (OUTPATIENT)
Dept: ORTHOPEDIC SURGERY | Facility: CLINIC | Age: 59
End: 2024-11-13

## 2024-11-13 VITALS
DIASTOLIC BLOOD PRESSURE: 81 MMHG | WEIGHT: 185 LBS | HEIGHT: 67 IN | BODY MASS INDEX: 29.03 KG/M2 | SYSTOLIC BLOOD PRESSURE: 122 MMHG | HEART RATE: 72 BPM

## 2024-11-13 DIAGNOSIS — M75.42 IMPINGEMENT SYNDROME OF LEFT SHOULDER: ICD-10-CM

## 2024-11-13 PROCEDURE — 99212 OFFICE O/P EST SF 10 MIN: CPT

## 2024-11-15 ENCOUNTER — APPOINTMENT (OUTPATIENT)
Dept: NEUROLOGY | Facility: CLINIC | Age: 59
End: 2024-11-15

## 2024-11-15 PROCEDURE — 96413 CHEMO IV INFUSION 1 HR: CPT

## 2024-11-15 PROCEDURE — 96415 CHEMO IV INFUSION ADDL HR: CPT

## 2024-11-19 ENCOUNTER — OUTPATIENT (OUTPATIENT)
Dept: OUTPATIENT SERVICES | Facility: HOSPITAL | Age: 59
LOS: 1 days | End: 2024-11-19
Payer: COMMERCIAL

## 2024-11-19 ENCOUNTER — RESULT REVIEW (OUTPATIENT)
Age: 59
End: 2024-11-19

## 2024-11-19 ENCOUNTER — APPOINTMENT (OUTPATIENT)
Dept: MAMMOGRAPHY | Facility: CLINIC | Age: 59
End: 2024-11-19
Payer: COMMERCIAL

## 2024-11-19 DIAGNOSIS — Z98.890 OTHER SPECIFIED POSTPROCEDURAL STATES: Chronic | ICD-10-CM

## 2024-11-19 DIAGNOSIS — Z00.8 ENCOUNTER FOR OTHER GENERAL EXAMINATION: ICD-10-CM

## 2024-11-19 DIAGNOSIS — Z90.89 ACQUIRED ABSENCE OF OTHER ORGANS: Chronic | ICD-10-CM

## 2024-11-19 DIAGNOSIS — Z01.419 ENCOUNTER FOR GYNECOLOGICAL EXAMINATION (GENERAL) (ROUTINE) WITHOUT ABNORMAL FINDINGS: ICD-10-CM

## 2024-11-19 DIAGNOSIS — Z41.9 ENCOUNTER FOR PROCEDURE FOR PURPOSES OTHER THAN REMEDYING HEALTH STATE, UNSPECIFIED: Chronic | ICD-10-CM

## 2024-11-19 PROCEDURE — 77067 SCR MAMMO BI INCL CAD: CPT

## 2024-11-19 PROCEDURE — 77067 SCR MAMMO BI INCL CAD: CPT | Mod: 26

## 2024-11-19 PROCEDURE — 77063 BREAST TOMOSYNTHESIS BI: CPT

## 2024-11-19 PROCEDURE — 77063 BREAST TOMOSYNTHESIS BI: CPT | Mod: 26

## 2024-12-02 ENCOUNTER — APPOINTMENT (OUTPATIENT)
Dept: FAMILY MEDICINE | Facility: CLINIC | Age: 59
End: 2024-12-02
Payer: COMMERCIAL

## 2024-12-02 VITALS
HEIGHT: 67 IN | HEART RATE: 81 BPM | WEIGHT: 189 LBS | BODY MASS INDEX: 29.66 KG/M2 | DIASTOLIC BLOOD PRESSURE: 80 MMHG | OXYGEN SATURATION: 98 % | SYSTOLIC BLOOD PRESSURE: 120 MMHG | TEMPERATURE: 97.9 F

## 2024-12-02 DIAGNOSIS — G35 MULTIPLE SCLEROSIS: ICD-10-CM

## 2024-12-02 DIAGNOSIS — I07.1 RHEUMATIC TRICUSPID INSUFFICIENCY: ICD-10-CM

## 2024-12-02 DIAGNOSIS — Z00.00 ENCOUNTER FOR GENERAL ADULT MEDICAL EXAMINATION W/OUT ABNORMAL FINDINGS: ICD-10-CM

## 2024-12-02 PROBLEM — R06.09 DYSPNEA ON EXERTION: Status: ACTIVE | Noted: 2024-12-02

## 2024-12-02 PROCEDURE — 99396 PREV VISIT EST AGE 40-64: CPT

## 2024-12-02 PROCEDURE — 36415 COLL VENOUS BLD VENIPUNCTURE: CPT

## 2024-12-03 DIAGNOSIS — R71.8 OTHER ABNORMALITY OF RED BLOOD CELLS: ICD-10-CM

## 2024-12-03 DIAGNOSIS — E87.5 HYPERKALEMIA: ICD-10-CM

## 2024-12-03 LAB
ALBUMIN SERPL ELPH-MCNC: 4.2 G/DL
ALP BLD-CCNC: 65 U/L
ALT SERPL-CCNC: 9 U/L
ANION GAP SERPL CALC-SCNC: 10 MMOL/L
AST SERPL-CCNC: 11 U/L
BASOPHILS # BLD AUTO: 0.04 K/UL
BASOPHILS NFR BLD AUTO: 0.7 %
BILIRUB SERPL-MCNC: 0.4 MG/DL
BUN SERPL-MCNC: 13 MG/DL
CALCIUM SERPL-MCNC: 9.7 MG/DL
CHLORIDE SERPL-SCNC: 106 MMOL/L
CHOLEST SERPL-MCNC: 139 MG/DL
CO2 SERPL-SCNC: 25 MMOL/L
CREAT SERPL-MCNC: 0.96 MG/DL
EGFR: 67 ML/MIN/1.73M2
EOSINOPHIL # BLD AUTO: 0.15 K/UL
EOSINOPHIL NFR BLD AUTO: 2.6 %
GLUCOSE SERPL-MCNC: 78 MG/DL
HCT VFR BLD CALC: 46 %
HDLC SERPL-MCNC: 57 MG/DL
HGB BLD-MCNC: 15.1 G/DL
IMM GRANULOCYTES NFR BLD AUTO: 0.3 %
LDLC SERPL CALC-MCNC: 66 MG/DL
LYMPHOCYTES # BLD AUTO: 1.38 K/UL
LYMPHOCYTES NFR BLD AUTO: 24.1 %
MAN DIFF?: NORMAL
MCHC RBC-ENTMCNC: 29.7 PG
MCHC RBC-ENTMCNC: 32.8 G/DL
MCV RBC AUTO: 90.4 FL
MONOCYTES # BLD AUTO: 0.56 K/UL
MONOCYTES NFR BLD AUTO: 9.8 %
NEUTROPHILS # BLD AUTO: 3.58 K/UL
NEUTROPHILS NFR BLD AUTO: 62.5 %
NONHDLC SERPL-MCNC: 82 MG/DL
PLATELET # BLD AUTO: 309 K/UL
POTASSIUM SERPL-SCNC: 5.7 MMOL/L
PROT SERPL-MCNC: 6.2 G/DL
RBC # BLD: 5.09 M/UL
RBC # FLD: 14 %
SODIUM SERPL-SCNC: 141 MMOL/L
TRIGL SERPL-MCNC: 82 MG/DL
TSH SERPL-ACNC: 3.39 UIU/ML
WBC # FLD AUTO: 5.73 K/UL

## 2024-12-05 ENCOUNTER — TRANSCRIPTION ENCOUNTER (OUTPATIENT)
Age: 59
End: 2024-12-05

## 2024-12-10 ENCOUNTER — TRANSCRIPTION ENCOUNTER (OUTPATIENT)
Age: 59
End: 2024-12-10

## 2024-12-10 DIAGNOSIS — N92.0 EXCESSIVE AND FREQUENT MENSTRUATION WITH REGULAR CYCLE: ICD-10-CM

## 2024-12-11 ENCOUNTER — APPOINTMENT (OUTPATIENT)
Dept: RHEUMATOLOGY | Facility: CLINIC | Age: 59
End: 2024-12-11

## 2024-12-11 VITALS
OXYGEN SATURATION: 97 % | BODY MASS INDEX: 29.82 KG/M2 | HEART RATE: 87 BPM | HEIGHT: 67 IN | TEMPERATURE: 97.6 F | SYSTOLIC BLOOD PRESSURE: 118 MMHG | WEIGHT: 190 LBS | DIASTOLIC BLOOD PRESSURE: 72 MMHG

## 2024-12-11 DIAGNOSIS — M72.2 PLANTAR FASCIAL FIBROMATOSIS: ICD-10-CM

## 2024-12-11 DIAGNOSIS — Z87.898 PERSONAL HISTORY OF OTHER SPECIFIED CONDITIONS: ICD-10-CM

## 2024-12-11 DIAGNOSIS — Z15.89 GENETIC SUSCEPTIBILITY TO OTHER DISEASE: ICD-10-CM

## 2024-12-11 DIAGNOSIS — M47.819 SPONDYLOSIS W/OUT MYELOPATHY OR RADICULOPATHY, SITE UNSPECIFIED: ICD-10-CM

## 2024-12-11 DIAGNOSIS — H04.123 DRY EYE SYNDROME OF BILATERAL LACRIMAL GLANDS: ICD-10-CM

## 2024-12-11 DIAGNOSIS — I73.00 RAYNAUD'S SYNDROME W/OUT GANGRENE: ICD-10-CM

## 2024-12-11 PROCEDURE — 99214 OFFICE O/P EST MOD 30 MIN: CPT

## 2024-12-11 PROCEDURE — G2211 COMPLEX E/M VISIT ADD ON: CPT

## 2024-12-13 ENCOUNTER — NON-APPOINTMENT (OUTPATIENT)
Age: 59
End: 2024-12-13

## 2024-12-13 ENCOUNTER — APPOINTMENT (OUTPATIENT)
Dept: CARDIOLOGY | Facility: CLINIC | Age: 59
End: 2024-12-13
Payer: COMMERCIAL

## 2024-12-13 VITALS
OXYGEN SATURATION: 97 % | DIASTOLIC BLOOD PRESSURE: 74 MMHG | HEIGHT: 67 IN | SYSTOLIC BLOOD PRESSURE: 116 MMHG | WEIGHT: 194 LBS | BODY MASS INDEX: 30.45 KG/M2 | HEART RATE: 75 BPM

## 2024-12-13 DIAGNOSIS — E78.00 PURE HYPERCHOLESTEROLEMIA, UNSPECIFIED: ICD-10-CM

## 2024-12-13 DIAGNOSIS — R79.82 ELEVATED C-REACTIVE PROTEIN (CRP): ICD-10-CM

## 2024-12-13 DIAGNOSIS — R06.09 OTHER FORMS OF DYSPNEA: ICD-10-CM

## 2024-12-13 PROCEDURE — 93000 ELECTROCARDIOGRAM COMPLETE: CPT

## 2024-12-13 PROCEDURE — G2211 COMPLEX E/M VISIT ADD ON: CPT

## 2024-12-13 PROCEDURE — 99214 OFFICE O/P EST MOD 30 MIN: CPT

## 2024-12-17 ENCOUNTER — OUTPATIENT (OUTPATIENT)
Dept: OUTPATIENT SERVICES | Facility: HOSPITAL | Age: 59
LOS: 1 days | End: 2024-12-17
Payer: COMMERCIAL

## 2024-12-17 ENCOUNTER — APPOINTMENT (OUTPATIENT)
Dept: ULTRASOUND IMAGING | Facility: CLINIC | Age: 59
End: 2024-12-17
Payer: COMMERCIAL

## 2024-12-17 ENCOUNTER — RESULT REVIEW (OUTPATIENT)
Age: 59
End: 2024-12-17

## 2024-12-17 DIAGNOSIS — N92.0 EXCESSIVE AND FREQUENT MENSTRUATION WITH REGULAR CYCLE: ICD-10-CM

## 2024-12-17 DIAGNOSIS — Z90.89 ACQUIRED ABSENCE OF OTHER ORGANS: Chronic | ICD-10-CM

## 2024-12-17 DIAGNOSIS — Z98.890 OTHER SPECIFIED POSTPROCEDURAL STATES: Chronic | ICD-10-CM

## 2024-12-17 DIAGNOSIS — Z41.9 ENCOUNTER FOR PROCEDURE FOR PURPOSES OTHER THAN REMEDYING HEALTH STATE, UNSPECIFIED: Chronic | ICD-10-CM

## 2024-12-17 DIAGNOSIS — Z00.8 ENCOUNTER FOR OTHER GENERAL EXAMINATION: ICD-10-CM

## 2024-12-17 PROCEDURE — 76830 TRANSVAGINAL US NON-OB: CPT | Mod: 26

## 2024-12-17 PROCEDURE — 76856 US EXAM PELVIC COMPLETE: CPT

## 2024-12-17 PROCEDURE — 76830 TRANSVAGINAL US NON-OB: CPT

## 2024-12-17 PROCEDURE — 76856 US EXAM PELVIC COMPLETE: CPT | Mod: 26

## 2024-12-20 ENCOUNTER — APPOINTMENT (OUTPATIENT)
Dept: OBGYN | Facility: CLINIC | Age: 59
End: 2024-12-20

## 2024-12-20 VITALS
DIASTOLIC BLOOD PRESSURE: 76 MMHG | HEIGHT: 67 IN | RESPIRATION RATE: 16 BRPM | HEART RATE: 78 BPM | BODY MASS INDEX: 30.45 KG/M2 | WEIGHT: 194 LBS | SYSTOLIC BLOOD PRESSURE: 120 MMHG

## 2024-12-20 DIAGNOSIS — N93.9 ABNORMAL UTERINE AND VAGINAL BLEEDING, UNSPECIFIED: ICD-10-CM

## 2024-12-20 DIAGNOSIS — Z79.890 HORMONE REPLACEMENT THERAPY: ICD-10-CM

## 2024-12-20 PROCEDURE — 99212 OFFICE O/P EST SF 10 MIN: CPT

## 2024-12-20 RX ORDER — MISOPROSTOL 200 UG/1
200 TABLET ORAL
Qty: 2 | Refills: 0 | Status: ACTIVE | COMMUNITY
Start: 2024-12-20 | End: 1900-01-01

## 2024-12-25 PROBLEM — F10.90 ALCOHOL USE: Status: ACTIVE | Noted: 2019-08-08

## 2024-12-28 ENCOUNTER — TRANSCRIPTION ENCOUNTER (OUTPATIENT)
Age: 59
End: 2024-12-28

## 2025-01-08 ENCOUNTER — APPOINTMENT (OUTPATIENT)
Dept: CARDIOLOGY | Facility: CLINIC | Age: 60
End: 2025-01-08
Payer: COMMERCIAL

## 2025-01-08 PROCEDURE — 93306 TTE W/DOPPLER COMPLETE: CPT

## 2025-01-08 PROCEDURE — 93015 CV STRESS TEST SUPVJ I&R: CPT

## 2025-01-10 ENCOUNTER — APPOINTMENT (OUTPATIENT)
Dept: CARDIOLOGY | Facility: CLINIC | Age: 60
End: 2025-01-10

## 2025-01-13 ENCOUNTER — LABORATORY RESULT (OUTPATIENT)
Age: 60
End: 2025-01-13

## 2025-01-13 ENCOUNTER — APPOINTMENT (OUTPATIENT)
Dept: OBGYN | Facility: CLINIC | Age: 60
End: 2025-01-13
Payer: COMMERCIAL

## 2025-01-13 VITALS
DIASTOLIC BLOOD PRESSURE: 70 MMHG | SYSTOLIC BLOOD PRESSURE: 122 MMHG | BODY MASS INDEX: 30.61 KG/M2 | WEIGHT: 195 LBS | HEIGHT: 67 IN

## 2025-01-13 DIAGNOSIS — N93.9 ABNORMAL UTERINE AND VAGINAL BLEEDING, UNSPECIFIED: ICD-10-CM

## 2025-01-13 PROCEDURE — 58558Z: CUSTOM

## 2025-01-21 ENCOUNTER — APPOINTMENT (OUTPATIENT)
Dept: DERMATOLOGY | Facility: CLINIC | Age: 60
End: 2025-01-21
Payer: COMMERCIAL

## 2025-01-21 DIAGNOSIS — L82.1 OTHER SEBORRHEIC KERATOSIS: ICD-10-CM

## 2025-01-21 DIAGNOSIS — Z12.83 ENCOUNTER FOR SCREENING FOR MALIGNANT NEOPLASM OF SKIN: ICD-10-CM

## 2025-01-21 DIAGNOSIS — L81.4 OTHER MELANIN HYPERPIGMENTATION: ICD-10-CM

## 2025-01-21 DIAGNOSIS — D48.9 NEOPLASM OF UNCERTAIN BEHAVIOR, UNSPECIFIED: ICD-10-CM

## 2025-01-21 DIAGNOSIS — L57.0 ACTINIC KERATOSIS: ICD-10-CM

## 2025-01-21 DIAGNOSIS — L82.0 INFLAMED SEBORRHEIC KERATOSIS: ICD-10-CM

## 2025-01-21 PROCEDURE — 11102 TANGNTL BX SKIN SINGLE LES: CPT | Mod: 59

## 2025-01-21 PROCEDURE — 17110 DESTRUCTION B9 LES UP TO 14: CPT

## 2025-01-21 PROCEDURE — 99213 OFFICE O/P EST LOW 20 MIN: CPT | Mod: 25

## 2025-01-21 PROCEDURE — 17000 DESTRUCT PREMALG LESION: CPT | Mod: 59

## 2025-01-24 ENCOUNTER — NON-APPOINTMENT (OUTPATIENT)
Age: 60
End: 2025-01-24

## 2025-01-24 LAB — CORE LAB BIOPSY: NORMAL

## 2025-03-12 ENCOUNTER — APPOINTMENT (OUTPATIENT)
Dept: NEUROLOGY | Facility: CLINIC | Age: 60
End: 2025-03-12
Payer: COMMERCIAL

## 2025-03-12 VITALS
TEMPERATURE: 98.1 F | HEART RATE: 85 BPM | OXYGEN SATURATION: 98 % | HEIGHT: 67 IN | BODY MASS INDEX: 30.04 KG/M2 | WEIGHT: 191.38 LBS | SYSTOLIC BLOOD PRESSURE: 124 MMHG | DIASTOLIC BLOOD PRESSURE: 80 MMHG | RESPIRATION RATE: 16 BRPM

## 2025-03-12 DIAGNOSIS — G35 MULTIPLE SCLEROSIS: ICD-10-CM

## 2025-03-12 PROCEDURE — G2211 COMPLEX E/M VISIT ADD ON: CPT

## 2025-03-12 PROCEDURE — 99214 OFFICE O/P EST MOD 30 MIN: CPT

## 2025-03-13 ENCOUNTER — TRANSCRIPTION ENCOUNTER (OUTPATIENT)
Age: 60
End: 2025-03-13

## 2025-03-13 LAB
ALBUMIN SERPL ELPH-MCNC: 4.4 G/DL
ALP BLD-CCNC: 65 U/L
ALT SERPL-CCNC: 10 U/L
ANION GAP SERPL CALC-SCNC: 10 MMOL/L
AST SERPL-CCNC: 15 U/L
BASOPHILS # BLD AUTO: 0.05 K/UL
BASOPHILS NFR BLD AUTO: 0.7 %
BILIRUB SERPL-MCNC: 0.3 MG/DL
BUN SERPL-MCNC: 14 MG/DL
CALCIUM SERPL-MCNC: 9.2 MG/DL
CD16+CD56+ CELLS # BLD: 193 CELLS/UL
CD16+CD56+ CELLS NFR BLD: 14 %
CD19 CELLS NFR BLD: 1 CELLS/UL
CD3 CELLS # BLD: 1158 CELLS/UL
CD3 CELLS NFR BLD: 85 %
CD3+CD4+ CELLS # BLD: 1003 CELLS/UL
CD3+CD4+ CELLS NFR BLD: 75 %
CD3+CD4+ CELLS/CD3+CD8+ CLL SPEC: 7.24 RATIO
CD3+CD8+ CELLS # SPEC: 138 CELLS/UL
CD3+CD8+ CELLS NFR BLD: 10 %
CELLS.CD3-CD19+/CELLS IN BLOOD: <1 %
CHLORIDE SERPL-SCNC: 108 MMOL/L
CO2 SERPL-SCNC: 24 MMOL/L
CREAT SERPL-MCNC: 0.89 MG/DL
EGFRCR SERPLBLD CKD-EPI 2021: 74 ML/MIN/1.73M2
EOSINOPHIL # BLD AUTO: 0.32 K/UL
EOSINOPHIL NFR BLD AUTO: 4.6 %
GLUCOSE SERPL-MCNC: 91 MG/DL
HCT VFR BLD CALC: 45.6 %
HGB BLD-MCNC: 14.8 G/DL
IMM GRANULOCYTES NFR BLD AUTO: 0.1 %
LYMPHOCYTES # BLD AUTO: 1.39 K/UL
LYMPHOCYTES NFR BLD AUTO: 20.1 %
MAN DIFF?: NORMAL
MCHC RBC-ENTMCNC: 29.5 PG
MCHC RBC-ENTMCNC: 32.5 G/DL
MCV RBC AUTO: 91 FL
MONOCYTES # BLD AUTO: 0.5 K/UL
MONOCYTES NFR BLD AUTO: 7.2 %
NEUTROPHILS # BLD AUTO: 4.64 K/UL
NEUTROPHILS NFR BLD AUTO: 67.3 %
PLATELET # BLD AUTO: 302 K/UL
POTASSIUM SERPL-SCNC: 5.2 MMOL/L
PROT SERPL-MCNC: 6.6 G/DL
RBC # BLD: 5.01 M/UL
RBC # FLD: 13.1 %
SODIUM SERPL-SCNC: 142 MMOL/L
WBC # FLD AUTO: 6.91 K/UL

## 2025-03-14 ENCOUNTER — TRANSCRIPTION ENCOUNTER (OUTPATIENT)
Age: 60
End: 2025-03-14

## 2025-03-14 LAB
MEV IGG FLD QL IA: 156 AU/ML
MEV IGG+IGM SER-IMP: POSITIVE
MUV AB SER-ACNC: POSITIVE
MUV IGG SER QL IA: 69.5 AU/ML
RUBV IGG FLD-ACNC: 1.9 INDEX
RUBV IGG SER-IMP: POSITIVE
VZV AB TITR SER: POSITIVE
VZV IGG SER IF-ACNC: 32 S/CO

## 2025-04-04 ENCOUNTER — APPOINTMENT (OUTPATIENT)
Dept: MRI IMAGING | Facility: CLINIC | Age: 60
End: 2025-04-04

## 2025-04-04 ENCOUNTER — OUTPATIENT (OUTPATIENT)
Dept: OUTPATIENT SERVICES | Facility: HOSPITAL | Age: 60
LOS: 1 days | End: 2025-04-04
Payer: COMMERCIAL

## 2025-04-04 DIAGNOSIS — Z90.89 ACQUIRED ABSENCE OF OTHER ORGANS: Chronic | ICD-10-CM

## 2025-04-04 DIAGNOSIS — Z98.890 OTHER SPECIFIED POSTPROCEDURAL STATES: Chronic | ICD-10-CM

## 2025-04-04 DIAGNOSIS — Z41.9 ENCOUNTER FOR PROCEDURE FOR PURPOSES OTHER THAN REMEDYING HEALTH STATE, UNSPECIFIED: Chronic | ICD-10-CM

## 2025-04-04 DIAGNOSIS — G35 MULTIPLE SCLEROSIS: ICD-10-CM

## 2025-04-04 PROCEDURE — 72156 MRI NECK SPINE W/O & W/DYE: CPT | Mod: 26

## 2025-04-04 PROCEDURE — 72156 MRI NECK SPINE W/O & W/DYE: CPT

## 2025-04-04 PROCEDURE — 70553 MRI BRAIN STEM W/O & W/DYE: CPT | Mod: 26

## 2025-04-04 PROCEDURE — 76377 3D RENDER W/INTRP POSTPROCES: CPT

## 2025-04-04 PROCEDURE — A9585: CPT

## 2025-04-04 PROCEDURE — 70553 MRI BRAIN STEM W/O & W/DYE: CPT

## 2025-04-04 PROCEDURE — 76377 3D RENDER W/INTRP POSTPROCES: CPT | Mod: 26

## 2025-05-13 ENCOUNTER — APPOINTMENT (OUTPATIENT)
Dept: NEUROLOGY | Facility: CLINIC | Age: 60
End: 2025-05-13

## 2025-05-13 PROCEDURE — 96415 CHEMO IV INFUSION ADDL HR: CPT

## 2025-05-13 PROCEDURE — 96413 CHEMO IV INFUSION 1 HR: CPT

## 2025-05-16 ENCOUNTER — TRANSCRIPTION ENCOUNTER (OUTPATIENT)
Age: 60
End: 2025-05-16

## 2025-06-18 ENCOUNTER — APPOINTMENT (OUTPATIENT)
Dept: RHEUMATOLOGY | Facility: CLINIC | Age: 60
End: 2025-06-18
Payer: COMMERCIAL

## 2025-06-18 VITALS
DIASTOLIC BLOOD PRESSURE: 68 MMHG | OXYGEN SATURATION: 99 % | BODY MASS INDEX: 28.74 KG/M2 | WEIGHT: 183.1 LBS | HEIGHT: 67 IN | HEART RATE: 67 BPM | TEMPERATURE: 98.2 F | SYSTOLIC BLOOD PRESSURE: 110 MMHG

## 2025-06-18 PROCEDURE — 36415 COLL VENOUS BLD VENIPUNCTURE: CPT

## 2025-06-18 PROCEDURE — 99214 OFFICE O/P EST MOD 30 MIN: CPT

## 2025-06-18 PROCEDURE — G2211 COMPLEX E/M VISIT ADD ON: CPT

## 2025-06-19 LAB
CRP SERPL-MCNC: 3 MG/L
ERYTHROCYTE [SEDIMENTATION RATE] IN BLOOD BY WESTERGREN METHOD: 13 MM/HR

## 2025-06-24 ENCOUNTER — APPOINTMENT (OUTPATIENT)
Dept: FAMILY MEDICINE | Facility: CLINIC | Age: 60
End: 2025-06-24
Payer: COMMERCIAL

## 2025-06-24 VITALS
TEMPERATURE: 97.9 F | BODY MASS INDEX: 28.88 KG/M2 | OXYGEN SATURATION: 98 % | WEIGHT: 184 LBS | HEART RATE: 66 BPM | SYSTOLIC BLOOD PRESSURE: 110 MMHG | DIASTOLIC BLOOD PRESSURE: 76 MMHG | HEIGHT: 67 IN

## 2025-06-24 PROBLEM — J01.90 ACUTE NON-RECURRENT SINUSITIS: Status: ACTIVE | Noted: 2025-06-24

## 2025-06-24 PROCEDURE — 99213 OFFICE O/P EST LOW 20 MIN: CPT

## 2025-06-24 RX ORDER — AMOXICILLIN AND CLAVULANATE POTASSIUM 875; 125 MG/1; MG/1
875-125 TABLET, COATED ORAL
Qty: 14 | Refills: 0 | Status: ACTIVE | COMMUNITY
Start: 2025-06-24 | End: 1900-01-01

## 2025-07-16 ENCOUNTER — APPOINTMENT (OUTPATIENT)
Dept: ORTHOPEDIC SURGERY | Facility: CLINIC | Age: 60
End: 2025-07-16
Payer: COMMERCIAL

## 2025-07-16 VITALS — BODY MASS INDEX: 28.88 KG/M2 | WEIGHT: 184 LBS | HEIGHT: 67 IN

## 2025-07-16 PROCEDURE — 99214 OFFICE O/P EST MOD 30 MIN: CPT

## 2025-07-21 ENCOUNTER — APPOINTMENT (OUTPATIENT)
Dept: OBGYN | Facility: CLINIC | Age: 60
End: 2025-07-21
Payer: COMMERCIAL

## 2025-07-21 VITALS
RESPIRATION RATE: 18 BRPM | WEIGHT: 187 LBS | HEIGHT: 67 IN | DIASTOLIC BLOOD PRESSURE: 74 MMHG | HEART RATE: 72 BPM | BODY MASS INDEX: 29.35 KG/M2 | SYSTOLIC BLOOD PRESSURE: 120 MMHG

## 2025-07-21 DIAGNOSIS — N93.9 ABNORMAL UTERINE AND VAGINAL BLEEDING, UNSPECIFIED: ICD-10-CM

## 2025-07-21 DIAGNOSIS — Z79.890 HORMONE REPLACEMENT THERAPY: ICD-10-CM

## 2025-07-21 PROBLEM — M17.11 PRIMARY OSTEOARTHRITIS OF RIGHT KNEE: Status: ACTIVE | Noted: 2025-07-16

## 2025-07-21 PROBLEM — M76.62 ACHILLES TENDINITIS OF LEFT LOWER EXTREMITY: Status: ACTIVE | Noted: 2025-07-16

## 2025-07-21 PROCEDURE — 99213 OFFICE O/P EST LOW 20 MIN: CPT | Mod: 25

## 2025-07-21 PROCEDURE — 76830 TRANSVAGINAL US NON-OB: CPT

## 2025-07-21 PROCEDURE — 82270 OCCULT BLOOD FECES: CPT

## 2025-07-22 ENCOUNTER — TRANSCRIPTION ENCOUNTER (OUTPATIENT)
Age: 60
End: 2025-07-22

## 2025-08-15 ENCOUNTER — TRANSCRIPTION ENCOUNTER (OUTPATIENT)
Age: 60
End: 2025-08-15

## 2025-08-21 ENCOUNTER — APPOINTMENT (OUTPATIENT)
Dept: RHEUMATOLOGY | Facility: CLINIC | Age: 60
End: 2025-08-21
Payer: COMMERCIAL

## 2025-08-21 VITALS
WEIGHT: 180 LBS | TEMPERATURE: 97.9 F | OXYGEN SATURATION: 98 % | HEIGHT: 67 IN | BODY MASS INDEX: 28.25 KG/M2 | HEART RATE: 73 BPM | SYSTOLIC BLOOD PRESSURE: 119 MMHG | DIASTOLIC BLOOD PRESSURE: 79 MMHG

## 2025-08-21 DIAGNOSIS — M25.50 PAIN IN UNSPECIFIED JOINT: ICD-10-CM

## 2025-08-21 DIAGNOSIS — Z15.89 GENETIC SUSCEPTIBILITY TO OTHER DISEASE: ICD-10-CM

## 2025-08-21 DIAGNOSIS — I73.00 RAYNAUD'S SYNDROME W/OUT GANGRENE: ICD-10-CM

## 2025-08-21 DIAGNOSIS — M47.819 SPONDYLOSIS W/OUT MYELOPATHY OR RADICULOPATHY, SITE UNSPECIFIED: ICD-10-CM

## 2025-08-21 DIAGNOSIS — H04.123 DRY EYE SYNDROME OF BILATERAL LACRIMAL GLANDS: ICD-10-CM

## 2025-08-21 DIAGNOSIS — M25.559 PAIN IN UNSPECIFIED HIP: ICD-10-CM

## 2025-08-21 DIAGNOSIS — M72.2 PLANTAR FASCIAL FIBROMATOSIS: ICD-10-CM

## 2025-08-21 DIAGNOSIS — Z78.0 ASYMPTOMATIC MENOPAUSAL STATE: ICD-10-CM

## 2025-08-21 DIAGNOSIS — M25.569 PAIN IN UNSPECIFIED KNEE: ICD-10-CM

## 2025-08-21 PROCEDURE — 99214 OFFICE O/P EST MOD 30 MIN: CPT

## 2025-08-21 PROCEDURE — G2211 COMPLEX E/M VISIT ADD ON: CPT

## 2025-08-22 ENCOUNTER — TRANSCRIPTION ENCOUNTER (OUTPATIENT)
Age: 60
End: 2025-08-22

## 2025-08-22 ENCOUNTER — RESULT REVIEW (OUTPATIENT)
Age: 60
End: 2025-08-22

## 2025-08-22 ENCOUNTER — OUTPATIENT (OUTPATIENT)
Dept: OUTPATIENT SERVICES | Facility: HOSPITAL | Age: 60
LOS: 1 days | End: 2025-08-22
Payer: COMMERCIAL

## 2025-08-22 ENCOUNTER — APPOINTMENT (OUTPATIENT)
Dept: RADIOLOGY | Facility: CLINIC | Age: 60
End: 2025-08-22

## 2025-08-22 DIAGNOSIS — G35 MULTIPLE SCLEROSIS: ICD-10-CM

## 2025-08-22 DIAGNOSIS — Z41.9 ENCOUNTER FOR PROCEDURE FOR PURPOSES OTHER THAN REMEDYING HEALTH STATE, UNSPECIFIED: Chronic | ICD-10-CM

## 2025-08-22 DIAGNOSIS — M25.569 PAIN IN UNSPECIFIED KNEE: ICD-10-CM

## 2025-08-22 DIAGNOSIS — Z90.89 ACQUIRED ABSENCE OF OTHER ORGANS: Chronic | ICD-10-CM

## 2025-08-22 DIAGNOSIS — Z98.890 OTHER SPECIFIED POSTPROCEDURAL STATES: Chronic | ICD-10-CM

## 2025-08-22 PROCEDURE — 73565 X-RAY EXAM OF KNEES: CPT | Mod: 26

## 2025-08-22 PROCEDURE — 73521 X-RAY EXAM HIPS BI 2 VIEWS: CPT

## 2025-08-22 PROCEDURE — 73521 X-RAY EXAM HIPS BI 2 VIEWS: CPT | Mod: 26

## 2025-08-22 PROCEDURE — 73565 X-RAY EXAM OF KNEES: CPT

## 2025-08-26 ENCOUNTER — TRANSCRIPTION ENCOUNTER (OUTPATIENT)
Age: 60
End: 2025-08-26

## 2025-08-27 ENCOUNTER — TRANSCRIPTION ENCOUNTER (OUTPATIENT)
Age: 60
End: 2025-08-27